# Patient Record
Sex: FEMALE | Race: BLACK OR AFRICAN AMERICAN | NOT HISPANIC OR LATINO | Employment: OTHER | URBAN - METROPOLITAN AREA
[De-identification: names, ages, dates, MRNs, and addresses within clinical notes are randomized per-mention and may not be internally consistent; named-entity substitution may affect disease eponyms.]

---

## 2022-01-28 ENCOUNTER — APPOINTMENT (OUTPATIENT)
Dept: RADIOLOGY | Facility: CLINIC | Age: 77
End: 2022-01-28
Payer: COMMERCIAL

## 2022-01-28 ENCOUNTER — HOSPITAL ENCOUNTER (OUTPATIENT)
Facility: HOSPITAL | Age: 77
Setting detail: OBSERVATION
Discharge: HOME | End: 2022-01-29
Attending: EMERGENCY MEDICINE | Admitting: SURGERY
Payer: COMMERCIAL

## 2022-01-28 ENCOUNTER — OFFICE VISIT (OUTPATIENT)
Dept: URGENT CARE | Facility: CLINIC | Age: 77
End: 2022-01-28
Payer: COMMERCIAL

## 2022-01-28 VITALS
HEIGHT: 66 IN | OXYGEN SATURATION: 100 % | HEART RATE: 87 BPM | BODY MASS INDEX: 26.68 KG/M2 | RESPIRATION RATE: 18 BRPM | WEIGHT: 166 LBS | TEMPERATURE: 98.6 F

## 2022-01-28 DIAGNOSIS — R07.81 RIB PAIN ON RIGHT SIDE: ICD-10-CM

## 2022-01-28 DIAGNOSIS — S22.41XA CLOSED FRACTURE OF MULTIPLE RIBS OF RIGHT SIDE, INITIAL ENCOUNTER: Primary | ICD-10-CM

## 2022-01-28 DIAGNOSIS — R53.1 WEAKNESS: ICD-10-CM

## 2022-01-28 PROBLEM — G89.11 ACUTE PAIN DUE TO TRAUMA: Status: ACTIVE | Noted: 2022-01-28

## 2022-01-28 PROBLEM — S22.49XA CLOSED FRACTURE OF MULTIPLE RIBS: Status: ACTIVE | Noted: 2022-01-28

## 2022-01-28 PROBLEM — D46.9 MYELODYSPLASTIC SYNDROME (HCC): Status: ACTIVE | Noted: 2022-01-28

## 2022-01-28 PROBLEM — I25.10 CAD (CORONARY ARTERY DISEASE): Chronic | Status: ACTIVE | Noted: 2022-01-28
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
## 2022-01-28 PROBLEM — Z86.69 HISTORY OF SEIZURE DISORDER: Chronic | Status: ACTIVE | Noted: 2022-01-28

## 2022-01-28 LAB
ALBUMIN SERPL BCP-MCNC: 3.2 G/DL (ref 3.5–5)
ALP SERPL-CCNC: 108 U/L (ref 46–116)
ALT SERPL W P-5'-P-CCNC: 23 U/L (ref 12–78)
ANION GAP SERPL CALCULATED.3IONS-SCNC: 11 MMOL/L (ref 4–13)
AST SERPL W P-5'-P-CCNC: 21 U/L (ref 5–45)
ATRIAL RATE: 82 BPM
BASOPHILS # BLD AUTO: 0.02 THOUSANDS/ΜL (ref 0–0.1)
BASOPHILS NFR BLD AUTO: 0 % (ref 0–1)
BILIRUB SERPL-MCNC: 0.18 MG/DL (ref 0.2–1)
BUN SERPL-MCNC: 20 MG/DL (ref 5–25)
CALCIUM ALBUM COR SERPL-MCNC: 8.9 MG/DL (ref 8.3–10.1)
CALCIUM SERPL-MCNC: 8.3 MG/DL (ref 8.3–10.1)
CARDIAC TROPONIN I PNL SERPL HS: 6 NG/L
CHLORIDE SERPL-SCNC: 107 MMOL/L (ref 100–108)
CK SERPL-CCNC: 106 U/L (ref 26–192)
CO2 SERPL-SCNC: 21 MMOL/L (ref 21–32)
CREAT SERPL-MCNC: 1.27 MG/DL (ref 0.6–1.3)
EOSINOPHIL # BLD AUTO: 0 THOUSAND/ΜL (ref 0–0.61)
EOSINOPHIL NFR BLD AUTO: 0 % (ref 0–6)
ERYTHROCYTE [DISTWIDTH] IN BLOOD BY AUTOMATED COUNT: 18.9 % (ref 11.6–15.1)
GFR SERPL CREATININE-BSD FRML MDRD: 41 ML/MIN/1.73SQ M
GLUCOSE SERPL-MCNC: 105 MG/DL (ref 65–140)
HCT VFR BLD AUTO: 30.6 % (ref 34.8–46.1)
HGB BLD-MCNC: 9 G/DL (ref 11.5–15.4)
IMM GRANULOCYTES # BLD AUTO: 0.01 THOUSAND/UL (ref 0–0.2)
IMM GRANULOCYTES NFR BLD AUTO: 0 % (ref 0–2)
LYMPHOCYTES # BLD AUTO: 1.33 THOUSANDS/ΜL (ref 0.6–4.47)
LYMPHOCYTES NFR BLD AUTO: 26 % (ref 14–44)
MCH RBC QN AUTO: 25.3 PG (ref 26.8–34.3)
MCHC RBC AUTO-ENTMCNC: 29.4 G/DL (ref 31.4–37.4)
MCV RBC AUTO: 86 FL (ref 82–98)
MONOCYTES # BLD AUTO: 0.43 THOUSAND/ΜL (ref 0.17–1.22)
MONOCYTES NFR BLD AUTO: 8 % (ref 4–12)
NEUTROPHILS # BLD AUTO: 3.34 THOUSANDS/ΜL (ref 1.85–7.62)
NEUTS SEG NFR BLD AUTO: 66 % (ref 43–75)
NRBC BLD AUTO-RTO: 0 /100 WBCS
P AXIS: 86 DEGREES
PLATELET # BLD AUTO: 311 THOUSANDS/UL (ref 149–390)
PMV BLD AUTO: 10.9 FL (ref 8.9–12.7)
POTASSIUM SERPL-SCNC: 4.7 MMOL/L (ref 3.5–5.3)
PR INTERVAL: 172 MS
PROT SERPL-MCNC: 7.3 G/DL (ref 6.4–8.2)
QRS AXIS: 83 DEGREES
QRSD INTERVAL: 114 MS
QT INTERVAL: 404 MS
QTC INTERVAL: 472 MS
RBC # BLD AUTO: 3.56 MILLION/UL (ref 3.81–5.12)
SODIUM SERPL-SCNC: 139 MMOL/L (ref 136–145)
T WAVE AXIS: 76 DEGREES
VENTRICULAR RATE: 82 BPM
WBC # BLD AUTO: 5.13 THOUSAND/UL (ref 4.31–10.16)

## 2022-01-28 PROCEDURE — 99219 PR INITIAL OBSERVATION CARE/DAY 50 MINUTES: CPT | Performed by: SURGERY

## 2022-01-28 PROCEDURE — 85025 COMPLETE CBC W/AUTO DIFF WBC: CPT

## 2022-01-28 PROCEDURE — 99285 EMERGENCY DEPT VISIT HI MDM: CPT

## 2022-01-28 PROCEDURE — 96374 THER/PROPH/DIAG INJ IV PUSH: CPT

## 2022-01-28 PROCEDURE — 71101 X-RAY EXAM UNILAT RIBS/CHEST: CPT

## 2022-01-28 PROCEDURE — 99285 EMERGENCY DEPT VISIT HI MDM: CPT | Performed by: EMERGENCY MEDICINE

## 2022-01-28 PROCEDURE — 93010 ELECTROCARDIOGRAM REPORT: CPT | Performed by: INTERNAL MEDICINE

## 2022-01-28 PROCEDURE — S9083 URGENT CARE CENTER GLOBAL: HCPCS | Performed by: PHYSICIAN ASSISTANT

## 2022-01-28 PROCEDURE — 80053 COMPREHEN METABOLIC PANEL: CPT

## 2022-01-28 PROCEDURE — 99213 OFFICE O/P EST LOW 20 MIN: CPT | Performed by: PHYSICIAN ASSISTANT

## 2022-01-28 PROCEDURE — 84484 ASSAY OF TROPONIN QUANT: CPT

## 2022-01-28 PROCEDURE — 36415 COLL VENOUS BLD VENIPUNCTURE: CPT

## 2022-01-28 PROCEDURE — 82550 ASSAY OF CK (CPK): CPT

## 2022-01-28 PROCEDURE — 93005 ELECTROCARDIOGRAM TRACING: CPT

## 2022-01-28 RX ORDER — ASPIRIN 81 MG/1
81 TABLET, CHEWABLE ORAL DAILY
Status: DISCONTINUED | OUTPATIENT
Start: 2022-01-29 | End: 2022-01-29 | Stop reason: HOSPADM

## 2022-01-28 RX ORDER — ONDANSETRON 2 MG/ML
4 INJECTION INTRAMUSCULAR; INTRAVENOUS EVERY 6 HOURS PRN
Status: DISCONTINUED | OUTPATIENT
Start: 2022-01-28 | End: 2022-01-28

## 2022-01-28 RX ORDER — ACETAMINOPHEN 325 MG/1
975 TABLET ORAL EVERY 8 HOURS SCHEDULED
Status: DISCONTINUED | OUTPATIENT
Start: 2022-01-28 | End: 2022-01-29 | Stop reason: HOSPADM

## 2022-01-28 RX ORDER — ONDANSETRON 2 MG/ML
4 INJECTION INTRAMUSCULAR; INTRAVENOUS EVERY 4 HOURS PRN
Status: DISCONTINUED | OUTPATIENT
Start: 2022-01-28 | End: 2022-01-29 | Stop reason: HOSPADM

## 2022-01-28 RX ORDER — PHENOBARBITAL 32.4 MG/1
32.4 TABLET ORAL 2 TIMES DAILY
Status: DISCONTINUED | OUTPATIENT
Start: 2022-01-28 | End: 2022-01-29 | Stop reason: HOSPADM

## 2022-01-28 RX ORDER — POLYETHYLENE GLYCOL 3350 17 G/17G
17 POWDER, FOR SOLUTION ORAL DAILY PRN
Status: DISCONTINUED | OUTPATIENT
Start: 2022-01-28 | End: 2022-01-29 | Stop reason: HOSPADM

## 2022-01-28 RX ORDER — FENTANYL CITRATE 50 UG/ML
25 INJECTION, SOLUTION INTRAMUSCULAR; INTRAVENOUS ONCE
Status: COMPLETED | OUTPATIENT
Start: 2022-01-28 | End: 2022-01-28

## 2022-01-28 RX ORDER — HEPARIN SODIUM 5000 [USP'U]/ML
5000 INJECTION, SOLUTION INTRAVENOUS; SUBCUTANEOUS EVERY 8 HOURS SCHEDULED
Status: DISCONTINUED | OUTPATIENT
Start: 2022-01-28 | End: 2022-01-29 | Stop reason: HOSPADM

## 2022-01-28 RX ORDER — OXYCODONE HYDROCHLORIDE 5 MG/1
2.5 TABLET ORAL EVERY 4 HOURS PRN
Status: DISCONTINUED | OUTPATIENT
Start: 2022-01-28 | End: 2022-01-29 | Stop reason: HOSPADM

## 2022-01-28 RX ORDER — PHENYTOIN SODIUM 100 MG/1
100 CAPSULE, EXTENDED RELEASE ORAL 3 TIMES DAILY
COMMUNITY
Start: 2022-01-25

## 2022-01-28 RX ORDER — LIDOCAINE 50 MG/G
1 PATCH TOPICAL ONCE
Status: COMPLETED | OUTPATIENT
Start: 2022-01-28 | End: 2022-01-29

## 2022-01-28 RX ORDER — LIDOCAINE 50 MG/G
1 PATCH TOPICAL DAILY
Status: ACTIVE | OUTPATIENT
Start: 2022-01-28 | End: 2022-01-29

## 2022-01-28 RX ORDER — PHENOBARBITAL 30 MG/1
30 TABLET ORAL 2 TIMES DAILY
COMMUNITY
Start: 2022-01-27

## 2022-01-28 RX ORDER — METHOCARBAMOL 500 MG/1
250 TABLET, FILM COATED ORAL EVERY 6 HOURS SCHEDULED
Status: DISCONTINUED | OUTPATIENT
Start: 2022-01-28 | End: 2022-01-29

## 2022-01-28 RX ORDER — ATORVASTATIN CALCIUM 20 MG/1
20 TABLET, FILM COATED ORAL
COMMUNITY
Start: 2021-12-09

## 2022-01-28 RX ORDER — PHENYTOIN SODIUM 100 MG/1
100 CAPSULE, EXTENDED RELEASE ORAL EVERY 8 HOURS SCHEDULED
Status: DISCONTINUED | OUTPATIENT
Start: 2022-01-28 | End: 2022-01-29 | Stop reason: HOSPADM

## 2022-01-28 RX ORDER — POLYETHYLENE GLYCOL 3350 17 G/17G
1 POWDER, FOR SOLUTION ORAL DAILY PRN
COMMUNITY

## 2022-01-28 RX ORDER — FUROSEMIDE 20 MG/1
20 TABLET ORAL AS NEEDED
COMMUNITY

## 2022-01-28 RX ORDER — ALBUTEROL SULFATE 90 UG/1
2 AEROSOL, METERED RESPIRATORY (INHALATION) EVERY 6 HOURS PRN
COMMUNITY
Start: 2021-12-22 | End: 2022-12-22

## 2022-01-28 RX ORDER — ATORVASTATIN CALCIUM 40 MG/1
20 TABLET, FILM COATED ORAL
Status: DISCONTINUED | OUTPATIENT
Start: 2022-01-28 | End: 2022-01-29 | Stop reason: HOSPADM

## 2022-01-28 RX ORDER — HYDROMORPHONE HCL/PF 1 MG/ML
0.2 SYRINGE (ML) INJECTION EVERY 4 HOURS PRN
Status: DISCONTINUED | OUTPATIENT
Start: 2022-01-28 | End: 2022-01-29 | Stop reason: HOSPADM

## 2022-01-28 RX ORDER — FERROUS SULFATE 325(65) MG
325 TABLET ORAL 2 TIMES DAILY WITH MEALS
Status: DISCONTINUED | OUTPATIENT
Start: 2022-01-28 | End: 2022-01-29 | Stop reason: HOSPADM

## 2022-01-28 RX ORDER — ALBUTEROL SULFATE 90 UG/1
2 AEROSOL, METERED RESPIRATORY (INHALATION) EVERY 6 HOURS PRN
Status: DISCONTINUED | OUTPATIENT
Start: 2022-01-28 | End: 2022-01-29 | Stop reason: HOSPADM

## 2022-01-28 RX ORDER — GLUC/MSM/COLGN2/HYAL/ANTIARTH3 375-375-20
1 TABLET ORAL 2 TIMES DAILY
COMMUNITY

## 2022-01-28 RX ORDER — FERROUS SULFATE 325(65) MG
325 TABLET ORAL 2 TIMES DAILY WITH MEALS
COMMUNITY

## 2022-01-28 RX ORDER — ACETAMINOPHEN 325 MG/1
975 TABLET ORAL ONCE
Status: COMPLETED | OUTPATIENT
Start: 2022-01-28 | End: 2022-01-28

## 2022-01-28 RX ORDER — DOCUSATE SODIUM 100 MG/1
100 CAPSULE, LIQUID FILLED ORAL 2 TIMES DAILY
Status: DISCONTINUED | OUTPATIENT
Start: 2022-01-28 | End: 2022-01-28

## 2022-01-28 RX ORDER — SENNOSIDES 8.6 MG
2 TABLET ORAL DAILY
Status: DISCONTINUED | OUTPATIENT
Start: 2022-01-28 | End: 2022-01-28

## 2022-01-28 RX ORDER — OXYCODONE HYDROCHLORIDE 5 MG/1
5 TABLET ORAL EVERY 4 HOURS PRN
Status: DISCONTINUED | OUTPATIENT
Start: 2022-01-28 | End: 2022-01-29 | Stop reason: HOSPADM

## 2022-01-28 RX ORDER — AMOXICILLIN 250 MG
1 CAPSULE ORAL 2 TIMES DAILY
Status: DISCONTINUED | OUTPATIENT
Start: 2022-01-28 | End: 2022-01-29 | Stop reason: HOSPADM

## 2022-01-28 RX ORDER — B-COMPLEX WITH VITAMIN C
1 TABLET ORAL
Status: DISCONTINUED | OUTPATIENT
Start: 2022-01-29 | End: 2022-01-29 | Stop reason: HOSPADM

## 2022-01-28 RX ADMIN — OXYCODONE HYDROCHLORIDE 5 MG: 5 TABLET ORAL at 18:33

## 2022-01-28 RX ADMIN — PHENOBARBITAL 32.4 MG: 32.4 TABLET ORAL at 17:02

## 2022-01-28 RX ADMIN — ACETAMINOPHEN 975 MG: 325 TABLET, FILM COATED ORAL at 12:35

## 2022-01-28 RX ADMIN — LIDOCAINE 5% 1 PATCH: 700 PATCH TOPICAL at 12:35

## 2022-01-28 RX ADMIN — PHENYTOIN SODIUM 100 MG: 100 CAPSULE ORAL at 17:02

## 2022-01-28 RX ADMIN — FENTANYL CITRATE 25 MCG: 50 INJECTION INTRAMUSCULAR; INTRAVENOUS at 14:33

## 2022-01-28 RX ADMIN — HYDROMORPHONE HYDROCHLORIDE 0.2 MG: 1 INJECTION, SOLUTION INTRAMUSCULAR; INTRAVENOUS; SUBCUTANEOUS at 20:09

## 2022-01-28 RX ADMIN — FERROUS SULFATE TAB 325 MG (65 MG ELEMENTAL FE) 325 MG: 325 (65 FE) TAB at 17:04

## 2022-01-28 RX ADMIN — ATORVASTATIN CALCIUM 20 MG: 40 TABLET, FILM COATED ORAL at 17:05

## 2022-01-28 RX ADMIN — HEPARIN SODIUM 5000 UNITS: 5000 INJECTION INTRAVENOUS; SUBCUTANEOUS at 21:14

## 2022-01-28 RX ADMIN — PHENYTOIN SODIUM 100 MG: 100 CAPSULE ORAL at 21:14

## 2022-01-28 RX ADMIN — METHOCARBAMOL 250 MG: 500 TABLET ORAL at 17:02

## 2022-01-28 RX ADMIN — POLYETHYLENE GLYCOL 3350 17 G: 17 POWDER, FOR SOLUTION ORAL at 18:25

## 2022-01-28 RX ADMIN — DOCUSATE SODIUM AND SENNOSIDES 1 TABLET: 8.6; 5 TABLET, FILM COATED ORAL at 17:04

## 2022-01-28 RX ADMIN — ACETAMINOPHEN 975 MG: 325 TABLET, FILM COATED ORAL at 21:14

## 2022-01-28 NOTE — ASSESSMENT & PLAN NOTE
- Chronic history of mild dysplastic syndrome with no acute issues  - Continue home medication regimen as indicated    - Outpatient follow-up with PCP and Hematologist

## 2022-01-28 NOTE — ASSESSMENT & PLAN NOTE
- Patient with history of CAD and prior CABG with no evidence of acute coronary syndrome at this time  - Continue home medication regimen   - Outpatient follow-up with PCP

## 2022-01-28 NOTE — ASSESSMENT & PLAN NOTE
- History of seizure disorder with no recent seizures  - Continue home medication regimen   - Outpatient follow-up PCP

## 2022-01-28 NOTE — ASSESSMENT & PLAN NOTE
- Multiple right-sided rib fractures (7-8), present on admission   - Continue rib fracture protocol   - Continue to encourage incentive spirometer use and adequate pulmonary hygiene  Currently pulling >2,000 mL on I S   - Continue multimodal analgesic regimen  - May provide supplemental oxygen via nasal cannula as needed to maintain saturations greater than or equal to 94% if necessary  Patient currently on room air and maintaining oxygen saturations appropriately  - Repeat chest x-ray on 01/29/2022    - PT and OT evaluation and treatment as indicated

## 2022-01-28 NOTE — ED PROVIDER NOTES
History  Chief Complaint   Patient presents with   Romel Dietz Fall     PT presents after being seen at urgent care for a fall 4 days ago  PT fell on right side fracturing a few ribs  PT c/o rib pain and SOB  PT denies headstrike or LOC but does take ASA daily     Patient is a 72-year-old female presenting with chief complaint of right-sided chest wall pain  Patient fell at home on 01/24 and injured right-side of chest wall when she landed against the armrest of her couch  She attributes her fall to residual generalized weakness since her COVID infection weeks prior  Patient denies head strike with no LOC  Patient notes she has been falling more often since her COVID infection  Currently, patient rates right chest wall pain at 4/10 at rest   Activity increases intensity of pain to 8/10  Patient denies shortness of breath at rest   Patient admits shortness of breath on exertion  Patient denies substernal chest pressure, numbness/tingling, localized weakness, confusion, lightheadedness  She has taken ibuprofen 200 mg at home with no relief to right chest wall pain  Fall  Associated symptoms: no abdominal pain, no chest pain, no headaches, no nausea and no vomiting        Prior to Admission Medications   Prescriptions Last Dose Informant Patient Reported? Taking?    Aspirin 81 MG CAPS   Yes No   Sig: Take 81 mg by mouth daily     Calcium Carbonate-Vitamin D 600-200 MG-UNIT CAPS   Yes No   Sig: Take 1 tablet by mouth 2 (two) times a day   PHENobarbital 30 mg tablet   Yes No   Sig: Take 30 mg by mouth 2 (two) times a day   albuterol (PROVENTIL HFA,VENTOLIN HFA) 90 mcg/act inhaler   Yes No   Sig: Inhale 2 puffs every 6 (six) hours as needed   atorvastatin (LIPITOR) 20 mg tablet   Yes No   Sig: Take 20 mg by mouth   darbepoetin rhonda (ARANESP) 100 mcg/mL   Yes No   Sig: Inject 75 mcg under the skin   ferrous sulfate 325 (65 Fe) mg tablet   Yes No   Sig: Take 325 mg by mouth 2 (two) times a day with meals     furosemide (LASIX) 20 mg tablet   Yes No   Sig: Take 20 mg by mouth as needed   phenytoin (DILANTIN) 100 mg ER capsule   Yes No   Sig: Take 100 mg by mouth Three times a day   polyethylene glycol (MiraLax) 17 g packet   Yes No   Sig: Take 1 application by mouth daily as needed        Facility-Administered Medications: None       Past Medical History:   Diagnosis Date    Anemia     Carotid stenosis     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)     Coronary artery disease     Essential hypertension     Ischemic cardiomyopathy     Mixed hyperlipidemia     Myelodysplastic syndrome (HCC)     Seizures (HCC)     Venous insufficiency        Past Surgical History:   Procedure Laterality Date    CORONARY ARTERY BYPASS GRAFT      x5       History reviewed  No pertinent family history  I have reviewed and agree with the history as documented  E-Cigarette/Vaping    E-Cigarette Use Never User      E-Cigarette/Vaping Substances    Nicotine No     THC No     CBD No     Flavoring No     Other No     Unknown No      Social History     Tobacco Use    Smoking status: Former Smoker     Types: Cigarettes    Smokeless tobacco: Never Used   Vaping Use    Vaping Use: Never used   Substance Use Topics    Alcohol use: Not Currently    Drug use: Never        Review of Systems   Constitutional: Negative for chills and fever  HENT: Negative for rhinorrhea and sore throat  Eyes: Negative for photophobia and visual disturbance  Respiratory: Negative for shortness of breath and wheezing  Cardiovascular: Positive for leg swelling (Baseline)  Negative for chest pain and palpitations  Gastrointestinal: Negative for abdominal pain, diarrhea, nausea and vomiting  Genitourinary: Negative for dysuria and hematuria  Musculoskeletal: Positive for gait problem  Right-sided chest wall pain   Skin: Negative for color change and wound     Neurological: Negative for dizziness, weakness, light-headedness, numbness and headaches  Psychiatric/Behavioral: Negative for confusion and sleep disturbance  Physical Exam  ED Triage Vitals [01/28/22 1111]   Temperature Pulse Respirations Blood Pressure SpO2   98 1 °F (36 7 °C) 91 22 (!) 209/88 100 %      Temp Source Heart Rate Source Patient Position - Orthostatic VS BP Location FiO2 (%)   Oral Monitor Lying Right arm --      Pain Score       8             Orthostatic Vital Signs  Vitals:    01/28/22 1230 01/28/22 1315 01/28/22 1445 01/28/22 1500   BP: (!) 197/87 (!) 181/85 (!) 173/80 160/94   Pulse: 82      Patient Position - Orthostatic VS:           Physical Exam  Vitals reviewed  Constitutional:       General: She is not in acute distress  Appearance: She is not toxic-appearing  HENT:      Head: Normocephalic and atraumatic  Eyes:      Extraocular Movements: Extraocular movements intact  Conjunctiva/sclera: Conjunctivae normal    Cardiovascular:      Rate and Rhythm: Normal rate and regular rhythm  Heart sounds: Normal heart sounds  No murmur heard  Comments: Sitting /94mmHg  Standing /77mmHg  Pulmonary:      Effort: Pulmonary effort is normal  No respiratory distress  Breath sounds: Normal breath sounds  No wheezing, rhonchi or rales  Chest:      Chest wall: Tenderness: Chest wall tenderness on right side  Abdominal:      General: There is no distension  Tenderness: There is no abdominal tenderness  Musculoskeletal:         General: Tenderness (Chest wall tenderness on right side) present  Cervical back: Normal range of motion  No rigidity  Right lower leg: Edema present  Left lower leg: Edema present  Skin:     General: Skin is warm and dry  Neurological:      Mental Status: She is alert and oriented to person, place, and time  Mental status is at baseline     Psychiatric:         Mood and Affect: Mood normal          Behavior: Behavior normal          ED Medications  Medications   lidocaine (LIDODERM) 5 % patch 1 patch (1 patch Topical Medication Applied 1/28/22 1235)   heparin (porcine) subcutaneous injection 5,000 Units (has no administration in time range)   acetaminophen (TYLENOL) tablet 975 mg (has no administration in time range)   lidocaine (LIDODERM) 5 % patch 1 patch (has no administration in time range)   oxyCODONE (ROXICODONE) IR tablet 2 5 mg (has no administration in time range)   HYDROmorphone (DILAUDID) injection 0 2 mg (has no administration in time range)   oxyCODONE (ROXICODONE) IR tablet 5 mg (has no administration in time range)   senna-docusate sodium (SENOKOT S) 8 6-50 mg per tablet 1 tablet (has no administration in time range)   polyethylene glycol (MIRALAX) packet 17 g (has no administration in time range)   ondansetron (ZOFRAN) injection 4 mg (has no administration in time range)   naloxone (NARCAN) 0 04 mg/mL syringe 0 04 mg (has no administration in time range)   methocarbamol (ROBAXIN) tablet 250 mg (has no administration in time range)   acetaminophen (TYLENOL) tablet 975 mg (975 mg Oral Given 1/28/22 1235)   fentanyl citrate (PF) 100 MCG/2ML 25 mcg (25 mcg Intravenous Given 1/28/22 1433)       Diagnostic Studies  Results Reviewed     Procedure Component Value Units Date/Time    Platelet count [[id number]]     Lab Status: No result Specimen: Blood     Comprehensive metabolic panel [[id number]]  (Abnormal) Collected: 01/28/22 1221    Lab Status: Final result Specimen: Blood from Arm, Left Updated: 01/28/22 1406     Sodium 139 mmol/L      Potassium 4 7 mmol/L      Chloride 107 mmol/L      CO2 21 mmol/L      ANION GAP 11 mmol/L      BUN 20 mg/dL      Creatinine 1 27 mg/dL      Glucose 105 mg/dL      Calcium 8 3 mg/dL      Corrected Calcium 8 9 mg/dL      AST 21 U/L      ALT 23 U/L      Alkaline Phosphatase 108 U/L      Total Protein 7 3 g/dL      Albumin 3 2 g/dL      Total Bilirubin 0 18 mg/dL      eGFR 41 ml/min/1 73sq m     Narrative:      [place] guidelines for Chronic Kidney Disease (CKD):     Stage 1 with normal or high GFR (GFR > 90 mL/min/1 73 square meters)    Stage 2 Mild CKD (GFR = 60-89 mL/min/1 73 square meters)    Stage 3A Moderate CKD (GFR = 45-59 mL/min/1 73 square meters)    Stage 3B Moderate CKD (GFR = 30-44 mL/min/1 73 square meters)    Stage 4 Severe CKD (GFR = 15-29 mL/min/1 73 square meters)    Stage 5 End Stage CKD (GFR <15 mL/min/1 73 square meters)  Note: GFR calculation is accurate only with a steady state creatinine    CK Total with Reflex CKMB [[id number]]  (Normal) Collected: 01/28/22 1221    Lab Status: Final result Specimen: Blood from Arm, Left Updated: 01/28/22 1347     Total  U/L     HS Troponin 0hr (reflex protocol) [[id number]]  (Normal) Collected: 01/28/22 1221    Lab Status: Final result Specimen: Blood from Arm, Left Updated: 01/28/22 1316     hs TnI 0hr 6 ng/L     CBC and differential [[id number]]  (Abnormal) Collected: 01/28/22 1221    Lab Status: Final result Specimen: Blood from Arm, Left Updated: 01/28/22 1231     WBC 5 13 Thousand/uL      RBC 3 56 Million/uL      Hemoglobin 9 0 g/dL      Hematocrit 30 6 %      MCV 86 fL      MCH 25 3 pg      MCHC 29 4 g/dL      RDW 18 9 %      MPV 10 9 fL      Platelets 731 Thousands/uL      nRBC 0 /100 WBCs      Neutrophils Relative 66 %      Immat GRANS % 0 %      Lymphocytes Relative 26 %      Monocytes Relative 8 %      Eosinophils Relative 0 %      Basophils Relative 0 %      Neutrophils Absolute 3 34 Thousands/µL      Immature Grans Absolute 0 01 Thousand/uL      Lymphocytes Absolute 1 33 Thousands/µL      Monocytes Absolute 0 43 Thousand/µL      Eosinophils Absolute 0 00 Thousand/µL      Basophils Absolute 0 02 Thousands/µL                  XR chest pa & lateral (24 hours after admission)    (Results Pending)         Procedures  ECG 12 Lead Documentation Only    Date/Time: 1/28/2022 12:05 PM  Performed by: Rosemary Queen DO  Authorized by: Rosemary Queen DO ECG reviewed by me, the ED Provider: yes    Patient location:  ED  Interpretation:     Interpretation: normal    Rate:     ECG rate:  82    ECG rate assessment: normal    Rhythm:     Rhythm: sinus rhythm    QRS:     QRS axis:  Normal    QRS intervals: mildly increased   Conduction:     Conduction: normal    ST segments:     ST segments:  Normal  T waves:     T waves: normal            ED Course                             SBIRT 22yo+      Most Recent Value   SBIRT (22 yo +)    In order to provide better care to our patients, we are screening all of our patients for alcohol and drug use  Would it be okay to ask you these screening questions? Unable to answer at this time Filed at: 01/28/2022 1131                MDM  Number of Diagnoses or Management Options  Closed fracture of multiple ribs of right side, initial encounter  Diagnosis management comments: Right chest wall pain  Chest x-ray reveals acute mildly displaced rib fractures of ribs 6 and 7 on lateral aspect of right side  Patient hypertensive, likely due to pain  Technically, patient meets criteria for orthostatic hypotension without syncopal-like symptoms  Pain management provided  Discussion with trauma physician Dr Mervat Reeves about continued right chest wall pain even after administering pain medications and continued SOB on exertion  Trauma team evaluated patient and decided to admit patient under observation          Amount and/or Complexity of Data Reviewed  Clinical lab tests: ordered and reviewed  Tests in the radiology section of CPT®: ordered and reviewed  Tests in the medicine section of CPT®: ordered and reviewed        Disposition  Final diagnoses:   Closed fracture of multiple ribs of right side, initial encounter     Time reflects when diagnosis was documented in both MDM as applicable and the Disposition within this note     Time User Action Codes Description Comment    1/28/2022  2:39 PM Cody Inman Add [[id number]] Closed fracture of multiple ribs of right side, initial encounter       ED Disposition     ED Disposition Condition Date/Time Comment    Admit Stable Fri Jan 28, 2022  3:30 PM Case was discussed with Dr Bill Bradford (trauma) and the patient's admission status was agreed to be Admission Status: observation status to the service of Dr Bill Bradford  Follow-up Information    None         Patient's Medications   Discharge Prescriptions    No medications on file     No discharge procedures on file  PDMP Review     None           ED Provider  Attending physically available and evaluated [facility]  I managed the patient along with the ED Attending      Electronically Signed by         Ezio Porter DO  01/28/22 1538

## 2022-01-28 NOTE — ED ATTENDING ATTESTATION
1/28/2022  ILucila MD, saw and evaluated the patient  I have discussed the patient with the resident/non-physician practitioner and agree with the resident's/non-physician practitioner's findings, Plan of Care, and MDM as documented in the resident's/non-physician practitioner's note, except where noted  All available labs and Radiology studies were reviewed  I was present for key portions of any procedure(s) performed by the resident/non-physician practitioner and I was immediately available to provide assistance  At this point I agree with the current assessment done in the Emergency Department  I have conducted an independent evaluation of this patient a history and physical is as follows:    77-year-old female presents to the emergency department for evaluation right-sided chest pain  This has been ongoing for 4 days since having been weak and having fallen into the wooden arm of furniture  She denies having had any loss of consciousness or head impact at that time  She has had constant pain in the right side of the chest   This eases some at rest and intensifies with movement  She notes that she does have anemia at baseline with hemoglobins in the mid 8 range  As a result of this as well as having had COVID infection at the end of November she reports some ongoing weakness and shortness of breath beyond baseline  Since having fallen and impacting the chest she has noticed worsening of this with any mild activity  Her daughter notes that her breathing seems fine when she is seated but if she goes to change position even just to slightly more upright to eat a few bites of food or to get dressed or bathed breathing is much worse  Patient relates that this is a good portion of why she came in today  She denies having had lightheadedness, nausea, vomiting or change in appetite/oral intake  Blood pressure is elevated here today    Patient and daughter do believe that this is from discomfort  Daughter expresses hesitancy in blood pressure medications being used as she notes that for the past few years upon standing her blood pressure dips significantly  Thus far patient has tried occasional doses of ibuprofen 200 mg without relief in pain  She has not tried any other medications or modalities  Past medical history significant for seizure disorder, myelodysplasia with anemia, CAD, ischemic cardiomyopathy and CHF  A review blood pressures from routine office visits include December 22nd-159/74, November 5th 159/79 and October 12th 124/74  Patient did go to an urgent care center prior to this ED visit and was referred in  An x-ray was performed which revealed the presence of 2 minimally displaced rib fractures  On exam patient appears mildly uncomfortable at rest   Her mucous membranes are moist in her speech is clear  Heart sounds regular  Lungs clear to auscultation bilaterally  There is no tenderness of the posterior cervical, thoracic or lumbar region  No posterior right thoracic tenderness  Anterior right lower chest wall tenderness present without discoloration or appreciated crepitus  Abdomen is soft and nontender  Pupils briskly reactive to light bilaterally  EOMI  No facial motor or sensory abnormality appreciated  Good strength with upper and lower extremity movements  Upper and lower extremities nontender  +2 to +3 lower extremity edema with compression socks in place  Plus two bilateral PT pulses  Given increased weakness and baseline history of anemia will check labs including CBC  With blood pressure significantly elevated checking chemistry, EKG and troponin as well  Will administer acetaminophen high-dose along with lidocaine patch for pain relief  Explained to patient and daughter hesitancy in prescribing other stronger medications at this time as these can affect sensorium and reaction time    Should study results be unremarkable, blood pressure improved and pain adequately controlled she might be a candidate for discharge home  If concerning findings are found on studies and or pain/blood pressure are not adequately improved anticipate use of other analgesics and consult to Trauma Service with potential admission  ED Course  ED Course as of 01/28/22 1540   Fri Jan 28, 2022   1405 Hgb improved from mid - 8 range  Blood pressure has trended down to systolics in the 929N over 80s  Chemistry remains pending  CK and troponin are within normal limits  56 Chemistry has returned at baseline  GFR is 41  Systemic NSAID use contraindicated  18 Spoke with patient  She does not report much improvement discomfort  Blood pressure has lowered to 180s over 80s-acceptable for now  With concern for potential side effects other analgesics used will reach out to trauma service for possible admission  Fentanyl ordered at this time           Critical Care Time  Procedures

## 2022-01-28 NOTE — H&P
[facility]  H&P- Fidelina Davison 1945, 78 y o  female MRN: [id number]  Unit/Bed#: ED 15 Encounter: [id number]  Primary Care Provider: Lorelei Griffiths PA-C   Date and time admitted to hospital: 1/28/2022 11:03 AM    Fall  Assessment & Plan  - Status post fall with the below noted injuries  - Fall precautions   - PT and OT evaluation and treatment as indicated  - Case Management consultation for disposition planning  * Closed fracture of multiple ribs  Assessment & Plan  - Multiple right-sided rib fractures (7-8), present on admission   - Continue rib fracture protocol   - Continue to encourage incentive spirometer use and adequate pulmonary hygiene  Currently pulling >2,000 mL on I S   - Continue multimodal analgesic regimen  - May provide supplemental oxygen via nasal cannula as needed to maintain saturations greater than or equal to 94% if necessary  Patient currently on room air and maintaining oxygen saturations appropriately  - Repeat chest x-ray on 01/29/2022    - PT and OT evaluation and treatment as indicated  Acute pain due to trauma  Assessment & Plan  - Acute pain due to traumatic injuries  - Initiate multimodal analgesic regimen  - Initiate bowel regimen  - Monitor for adequate pain control and adjust regimen as indicated  Consider acute pain service consultation if pain and adequately controlled  CAD (coronary artery disease)  Assessment & Plan  - Patient with history of CAD and prior CABG with no evidence of acute coronary syndrome at this time  - Continue home medication regimen   - Outpatient follow-up with PCP  History of seizure disorder  Assessment & Plan  - History of seizure disorder with no recent seizures  - Continue home medication regimen   - Outpatient follow-up PCP  Myelodysplastic syndrome ([facility] [address] )  Assessment & Plan  - Chronic history of mild dysplastic syndrome with no acute issues    - Continue home medication regimen as indicated  - Outpatient follow-up with PCP and Hematologist       Disposition:  Admit to the trauma service for management of rib fractures and acute pain  Await therapy evaluation recommendations  H&P Exam - Trauma   Carlo Landis 78 y o  female MRN: [id number]  Unit/Bed#: ED 15 Encounter: [id number]    Assessment/Plan   Trauma Alert: Evaluation  Model of Arrival: [facility]  Trauma Team: Attending Dr Ethel Hi and EMILY Haile PA-C  Consultants: None    Trauma Active Problems and Trauma Plan: See above  Chief Complaint:  My chest hurts      History of Present Illness   HPI:  Candelario Sacks is a 78 y o  female who presents with right-sided chest wall pain following 2 recent falls  She initially fell 2 weeks ago and had another fall this past Sunday striking her right side on a chair  She denied hitting her head or losing consciousness during either fall  She only complains of right-sided chest wall pain and had some difficulty breathing  She notes some chronic issues with breathing and dyspnea on exertion since infection with COVID last fall  Mechanism:Fall    Review of Systems   Constitutional: Negative  Negative for activity change, appetite change, chills, diaphoresis, fatigue and fever  HENT: Negative  Negative for congestion, facial swelling and sore throat  Eyes: Negative  Respiratory: Negative  Negative for cough, chest tightness, shortness of breath and wheezing  Cardiovascular: Positive for chest pain (Right anterior and lateral chest wall pain)  Negative for leg swelling  Gastrointestinal: Negative  Negative for abdominal distention, abdominal pain, constipation, diarrhea, nausea and vomiting  Endocrine: Negative  Genitourinary: Negative  Negative for difficulty urinating, dysuria, flank pain, frequency and hematuria  Musculoskeletal: Negative  Negative for arthralgias, back pain, myalgias and neck pain  Skin: Negative    Negative for color change, pallor, rash and wound  Allergic/Immunologic: Negative  Neurological: Negative  Negative for dizziness, syncope, weakness, light-headedness and headaches  Hematological: Negative  Psychiatric/Behavioral: Negative  Negative for agitation, confusion, self-injury and sleep disturbance  The patient is not nervous/anxious  12-point, complete review of systems was reviewed and negative except as stated above         Historical Information   Efforts to obtain history included the following sources: other medical personnel, obtained from other records    Past Medical History:   Diagnosis Date    Anemia     Carotid stenosis     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 ([facility] )     Coronary artery disease     Essential hypertension     Ischemic cardiomyopathy     Mixed hyperlipidemia     Myelodysplastic syndrome ([facility] )     Seizures ([facility] )     Venous insufficiency      Past Surgical History:   Procedure Laterality Date    CORONARY ARTERY BYPASS GRAFT      x5     Social History   Social History     Substance and Sexual Activity   Alcohol Use Not Currently     Social History     Substance and Sexual Activity   Drug Use Never     Social History     Tobacco Use   Smoking Status Former Smoker    Types: Cigarettes   Smokeless Tobacco Never Used     E-Cigarette/Vaping    E-Cigarette Use Never User      E-Cigarette/Vaping Substances    Nicotine No     THC No     CBD No     Flavoring No     Other No     Unknown No      Immunization History   Administered Date(s) Administered    COVID-19 PFIZER VACCINE 0 3 ML IM 03/23/2021, 04/13/2021    INFLUENZA 09/18/2020, 10/12/2021    Influenza, Seasonal Vaccine, Quadrivalent, Adjuvanted,  5e 09/18/2020, 10/12/2021    Tdap 06/06/2019     Last Tetanus: Unknown  Family History: Non-contributory  Unable to obtain/limited by N/A      Meds/Allergies   all current active meds have been reviewed, current meds:   Current Facility-Administered Medications   Medication Dose Route Frequency    acetaminophen (TYLENOL) tablet 975 mg  975 mg Oral Q8H [address]    heparin (porcine) subcutaneous injection 5,000 Units  5,000 Units Subcutaneous Q8H [address]    HYDROmorphone (DILAUDID) injection 0 2 mg  0 2 mg Intravenous Q4H PRN    lidocaine (LIDODERM) 5 % patch 1 patch  1 patch Topical Once    lidocaine (LIDODERM) 5 % patch 1 patch  1 patch Topical Daily    methocarbamol (ROBAXIN) tablet 250 mg  250 mg Oral Q6H [address]    naloxone (NARCAN) 0 04 mg/mL syringe 0 04 mg  0 04 mg Intravenous Q1MIN PRN    ondansetron (ZOFRAN) injection 4 mg  4 mg Intravenous Q4H PRN    oxyCODONE (ROXICODONE) IR tablet 2 5 mg  2 5 mg Oral Q4H PRN    oxyCODONE (ROXICODONE) IR tablet 5 mg  5 mg Oral Q4H PRN    polyethylene glycol (MIRALAX) packet 17 g  17 g Oral Daily PRN    senna-docusate sodium (SENOKOT S) 8 6-50 mg per tablet 1 tablet  1 tablet Oral BID    and PTA meds:   Prior to Admission Medications   Prescriptions Last Dose Informant Patient Reported? Taking?    Aspirin 81 MG CAPS   Yes No   Sig: Take 81 mg by mouth daily     Calcium Carbonate-Vitamin D 600-200 MG-UNIT CAPS   Yes No   Sig: Take 1 tablet by mouth 2 (two) times a day   PHENobarbital 30 mg tablet   Yes No   Sig: Take 30 mg by mouth 2 (two) times a day   albuterol (PROVENTIL HFA,VENTOLIN HFA) 90 mcg/act inhaler   Yes No   Sig: Inhale 2 puffs every 6 (six) hours as needed   atorvastatin (LIPITOR) 20 mg tablet   Yes No   Sig: Take 20 mg by mouth   darbepoetin rhonda (ARANESP) 100 mcg/mL   Yes No   Sig: Inject 75 mcg under the skin   ferrous sulfate 325 (65 Fe) mg tablet   Yes No   Sig: Take 325 mg by mouth 2 (two) times a day with meals     furosemide (LASIX) 20 mg tablet   Yes No   Sig: Take 20 mg by mouth as needed   phenytoin (DILANTIN) 100 mg ER capsule   Yes No   Sig: Take 100 mg by mouth Three times a day   polyethylene glycol (MiraLax) 17 g packet   Yes No   Sig: Take 1 application by mouth daily as needed        Facility-Administered Medications: None       Allergies   Allergen Reactions    Carvedilol Other (See Comments)     Orthostatic Hypotension    Iodinated Diagnostic Agents Angioedema    Shellfish Allergy - Food Allergy Angioedema    Lisinopril Cough         PHYSICAL EXAM    PE limited by: N/A    Objective   Vitals:   First set: Temperature: 98 1 °F (36 7 °C) (01/28/22 1111)  Pulse: 91 (01/28/22 1111)  Respirations: 22 (01/28/22 1111)  Blood Pressure: (!) 209/88 (01/28/22 1111)    Primary Survey:   (A) Airway:  Patent and intact  (B) Breathing:  Clear and equal bilaterally  (C) Circulation: Pulses:   normal, carotid  2/4, pedal  2/4, radial  2/4 and femoral  2/4  (D) Disablity:  GCS Total:  15, Eye Opening:   Spontaneous = 4, Motor Response: Obeys commands = 6 and Verbal Response:  Oriented = 5  (E) Expose:  Completed    Secondary Survey: (Click on Physical Exam tab above)  Physical Exam  Vitals and nursing note reviewed  Exam conducted with a chaperone present  Constitutional:       General: She is awake  She is not in acute distress  Appearance: Normal appearance  She is not ill-appearing, toxic-appearing or diaphoretic  Interventions: She is not intubated  HENT:      Head: Normocephalic and atraumatic  No abrasion, contusion or laceration  Right Ear: Hearing and external ear normal  No swelling or tenderness  Left Ear: Hearing and external ear normal  No swelling or tenderness  Nose: Nose normal  No nasal deformity, septal deviation, signs of injury, laceration or nasal tenderness  Mouth/Throat:      Mouth: Mucous membranes are moist       Pharynx: Oropharynx is clear  Eyes:      General: Lids are normal  Vision grossly intact  Extraocular Movements: Extraocular movements intact  Conjunctiva/sclera: Conjunctivae normal       Pupils: Pupils are equal, round, and reactive to light        Comments: Pupils were 3 equal, round and reactive bilaterally   Cardiovascular:      Rate and Rhythm: Normal rate and regular rhythm  Pulses: Normal pulses  Carotid pulses are 2+ on the right side and 2+ on the left side  Radial pulses are 2+ on the right side and 2+ on the left side  Femoral pulses are 2+ on the right side and 2+ on the left side  Dorsalis pedis pulses are 2+ on the right side and 2+ on the left side  Heart sounds: Normal heart sounds  No murmur heard  No friction rub  No gallop  Pulmonary:      Effort: Pulmonary effort is normal  No tachypnea, bradypnea, accessory muscle usage, prolonged expiration, respiratory distress or retractions  She is not intubated  Breath sounds: Normal breath sounds and air entry  No stridor or decreased air movement  No decreased breath sounds, wheezing, rhonchi or rales  Chest:      Chest wall: Tenderness (Mild right lower anterior lateral chest wall tenderness without crepitus or deformity ) present  Abdominal:      General: Abdomen is flat  Bowel sounds are normal  There is no distension  Palpations: Abdomen is soft  Tenderness: There is no abdominal tenderness  There is no guarding or rebound  Musculoskeletal:         General: No swelling, tenderness, deformity or signs of injury  Normal range of motion  Cervical back: Neck supple  No signs of trauma or tenderness  No spinous process tenderness or muscular tenderness  Right lower leg: Edema present  Left lower leg: Edema present  Comments: No midline cervical, thoracic or lumbar spine tenderness, step-offs or deformities  No paraspinal muscular tenderness in the neck or back  Normal range of motion in all 4 extremities without pain, tenderness or deformity  Skin:     General: Skin is warm and dry  Capillary Refill: Capillary refill takes less than 2 seconds  Coloration: Skin is not jaundiced or pale  Findings: No abrasion, bruising, ecchymosis, erythema, laceration, lesion, rash or wound     Neurological: General: No focal deficit present  Mental Status: She is alert and oriented to person, place, and time  Mental status is at baseline  GCS: GCS eye subscore is 4  GCS verbal subscore is 5  GCS motor subscore is 6  Sensory: Sensation is intact  No sensory deficit  Motor: Motor function is intact  No weakness  Psychiatric:         Mood and Affect: Mood normal          Behavior: Behavior is cooperative  Invasive Devices  Report    Peripheral Intravenous Line            Peripheral IV 01/28/22 Left Antecubital <1 day                Lab Results:   Results: I have personally reviewed pertinent reports   , BMP/CMP:   Lab Results   Component Value Date    SODIUM 139 01/28/2022    K 4 7 01/28/2022     01/28/2022    CO2 21 01/28/2022    BUN 20 01/28/2022    CREATININE 1 27 01/28/2022    CALCIUM 8 3 01/28/2022    AST 21 01/28/2022    ALT 23 01/28/2022    ALKPHOS 108 01/28/2022    EGFR 41 01/28/2022   , CBC:   Lab Results   Component Value Date    WBC 5 13 01/28/2022    HGB 9 0 (L) 01/28/2022    HCT 30 6 (L) 01/28/2022    MCV 86 01/28/2022     01/28/2022    MCH 25 3 (L) 01/28/2022    MCHC 29 4 (L) 01/28/2022    RDW 18 9 (H) 01/28/2022    MPV 10 9 01/28/2022    NRBC 0 01/28/2022    and Coagulation: No results found for: PT, INR, APTT  Imaging/EKG Studies: Results: I have personally reviewed pertinent reports      Other Studies: N/A    Code Status: No Order  Advance Directive and Living Will:      Power of :    POLST:

## 2022-01-28 NOTE — ED NOTES
Pt sitting up on side of bed  States pain is better and she is able to sit up   Pt states that she refuses to be treated for anything else      Kyle Elliott RN  01/28/22 0279

## 2022-01-28 NOTE — ED NOTES
Patient is resting comfortably  Family at bedside  Pt received pain medications states pain has decreased slightly   Drink and food offered      Kenny Rachel RN  01/28/22 [id number]

## 2022-01-28 NOTE — PROGRESS NOTES
[facility]        NAME: Shar Mandel is a 78 y o  female  : 1945    MRN: [id number]  DATE: 2022  TIME: 10:22 AM    Assessment and Plan   Closed fracture of multiple ribs of right side, initial encounter [S22 41XA]  1  Closed fracture of multiple ribs of right side, initial encounter  Transfer to other facility    CANCELED: XR ribs left w pa chest min 3 views   2  Weakness  Transfer to other facility         Patient Instructions     Patient Instructions   Transfer to ED for further evaluation of rib fracture and weakness  [address] [place] [address]        **Portions of the record may have been created with voice recognition software  Occasional wrong word or "sound a like" substitutions may have occurred due to the inherent limitations of voice recognition software  Read the chart carefully and recognize, using context, where substitutions have occurred  **     Chief Complaint     Chief Complaint   Patient presents with    Rib Injury     fall in home on Tue  complains of R rib pain  History of Present Illness     70yo female presents to clinic with complaints of right rib pain x 4 days  She fell in her home on Tuesday and hit her right chest on a wooden armrest of her couch  She denies head injury or LOC  She had immediate severe pain but was able to get up after a few minutes with some assistance  She reports feeling short of breath  She has been falling increasingly more over the past few weeks and says it is due to anemia and recently having COVID-19  She ambulates with a walker  Review of Systems     Review of Systems   Constitutional: Positive for fatigue  Negative for fever  Respiratory: Positive for shortness of breath  Negative for cough  Cardiovascular: Positive for chest pain  Negative for palpitations  Gastrointestinal: Negative for abdominal pain, blood in stool, nausea and vomiting     Musculoskeletal: Positive for myalgias  Negative for arthralgias and neck pain  Skin: Negative for color change and wound  Neurological: Positive for weakness  Negative for dizziness, syncope, facial asymmetry, speech difficulty, light-headedness, numbness and headaches  Psychiatric/Behavioral: Positive for dysphoric mood           Current Medications     Current Outpatient Medications:     albuterol (PROVENTIL HFA,VENTOLIN HFA) 90 mcg/act inhaler, Inhale 2 puffs every 6 (six) hours as needed, Disp: , Rfl:     Aspirin 81 MG CAPS, Take 81 mg by mouth daily  , Disp: , Rfl:     atorvastatin (LIPITOR) 20 mg tablet, Take 20 mg by mouth, Disp: , Rfl:     Calcium Carbonate-Vitamin D 600-200 MG-UNIT CAPS, Take 1 tablet by mouth 2 (two) times a day, Disp: , Rfl:     darbepoetin rhonda (ARANESP) 100 mcg/mL, Inject 75 mcg under the skin, Disp: , Rfl:     ferrous sulfate 325 (65 Fe) mg tablet, Take 325 mg by mouth daily with breakfast, Disp: , Rfl:     furosemide (LASIX) 20 mg tablet, Take 20 mg by mouth as needed, Disp: , Rfl:     PHENobarbital 30 mg tablet, Take 30 mg by mouth 2 (two) times a day, Disp: , Rfl:     phenytoin (DILANTIN) 100 mg ER capsule, Take 100 mg by mouth Three times a day, Disp: , Rfl:     polyethylene glycol (MiraLax) 17 g packet, Take 1 application by mouth, Disp: , Rfl:     Current Allergies     Allergies as of 01/28/2022 - Reviewed 01/28/2022   Allergen Reaction Noted    Carvedilol Other (See Comments) 08/21/2019    Iodinated diagnostic agents Angioedema 09/02/2011    Shellfish allergy - food allergy Angioedema 09/02/2011    Lisinopril Cough 08/21/2019            The following portions of the patient's history were reviewed and updated as appropriate: allergies, current medications, past family history, past medical history, past social history, past surgical history and problem list      Past Medical History:   Diagnosis Date    Anemia     Carotid stenosis     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)     Coronary artery disease     Essential hypertension     Ischemic cardiomyopathy     Mixed hyperlipidemia     Myelodysplastic syndrome (HCC)     Seizures (HCC)     Venous insufficiency        Past Surgical History:   Procedure Laterality Date    CORONARY ARTERY BYPASS GRAFT      x5       No family history on file  Medications have been verified  Objective     Pulse 87   Temp 98 6 °F (37 °C) (Temporal)   Resp 18   Ht 5' 5 5" (1 664 m)   Wt 75 3 kg (166 lb)   SpO2 100%   BMI 27 20 kg/m²        Physical Exam     Physical Exam  Vitals and nursing note reviewed  Constitutional:       General: She is not in acute distress  Appearance: She is not diaphoretic  HENT:      Head: Normocephalic and atraumatic  Eyes:      Extraocular Movements: Extraocular movements intact  Pupils: Pupils are equal, round, and reactive to light  Cardiovascular:      Rate and Rhythm: Normal rate and regular rhythm  Pulmonary:      Effort: Pulmonary effort is normal  No respiratory distress  Chest:      Chest wall: Tenderness present  No deformity or edema  Skin:     Coloration: Skin is not pale  Findings: No bruising or erythema  Neurological:      Mental Status: She is alert and oriented to person, place, and time  Psychiatric:         Mood and Affect: Mood is depressed

## 2022-01-28 NOTE — ASSESSMENT & PLAN NOTE
- Status post fall with the below noted injuries  - Fall precautions   - PT and OT evaluation and treatment as indicated  - Case Management consultation for disposition planning

## 2022-01-28 NOTE — PATIENT INSTRUCTIONS
Transfer to ED for further evaluation of rib fracture and weakness       [facility] Nanda Fernández [facility], [address] [facility]

## 2022-01-28 NOTE — Clinical Note
Case was discussed with Dr Ashutosh Morse (trauma) and the patient's admission status was agreed to be Admission Status: observation status to the service of Dr Ashutosh Morse

## 2022-01-28 NOTE — ASSESSMENT & PLAN NOTE
- Acute pain due to traumatic injuries  - Initiate multimodal analgesic regimen  - Initiate bowel regimen  - Monitor for adequate pain control and adjust regimen as indicated  Consider acute pain service consultation if pain and adequately controlled

## 2022-01-29 ENCOUNTER — APPOINTMENT (OUTPATIENT)
Dept: RADIOLOGY | Facility: HOSPITAL | Age: 77
End: 2022-01-29
Payer: COMMERCIAL

## 2022-01-29 VITALS
HEART RATE: 92 BPM | OXYGEN SATURATION: 100 % | SYSTOLIC BLOOD PRESSURE: 167 MMHG | RESPIRATION RATE: 18 BRPM | BODY MASS INDEX: 27.2 KG/M2 | WEIGHT: 166 LBS | TEMPERATURE: 98.1 F | DIASTOLIC BLOOD PRESSURE: 81 MMHG

## 2022-01-29 LAB
APTT PPP: 35 SECONDS (ref 23–37)
ERYTHROCYTE [DISTWIDTH] IN BLOOD BY AUTOMATED COUNT: 19.6 % (ref 11.6–15.1)
HCT VFR BLD AUTO: 30.7 % (ref 34.8–46.1)
HGB BLD-MCNC: 8.8 G/DL (ref 11.5–15.4)
INR PPP: 1.07 (ref 0.84–1.19)
MCH RBC QN AUTO: 24.8 PG (ref 26.8–34.3)
MCHC RBC AUTO-ENTMCNC: 28.7 G/DL (ref 31.4–37.4)
MCV RBC AUTO: 87 FL (ref 82–98)
PLATELET # BLD AUTO: 267 THOUSANDS/UL (ref 149–390)
PMV BLD AUTO: 9.6 FL (ref 8.9–12.7)
PROTHROMBIN TIME: 13.9 SECONDS (ref 11.6–14.5)
RBC # BLD AUTO: 3.55 MILLION/UL (ref 3.81–5.12)
WBC # BLD AUTO: 3.82 THOUSAND/UL (ref 4.31–10.16)

## 2022-01-29 PROCEDURE — 99217 PR OBSERVATION CARE DISCHARGE MANAGEMENT: CPT | Performed by: PHYSICIAN ASSISTANT

## 2022-01-29 PROCEDURE — NC001 PR NO CHARGE: Performed by: SURGERY

## 2022-01-29 PROCEDURE — 85730 THROMBOPLASTIN TIME PARTIAL: CPT | Performed by: PHYSICIAN ASSISTANT

## 2022-01-29 PROCEDURE — 85027 COMPLETE CBC AUTOMATED: CPT | Performed by: PHYSICIAN ASSISTANT

## 2022-01-29 PROCEDURE — 97163 PT EVAL HIGH COMPLEX 45 MIN: CPT

## 2022-01-29 PROCEDURE — 85610 PROTHROMBIN TIME: CPT | Performed by: PHYSICIAN ASSISTANT

## 2022-01-29 PROCEDURE — 97116 GAIT TRAINING THERAPY: CPT

## 2022-01-29 PROCEDURE — 36415 COLL VENOUS BLD VENIPUNCTURE: CPT | Performed by: PHYSICIAN ASSISTANT

## 2022-01-29 PROCEDURE — 99205 OFFICE O/P NEW HI 60 MIN: CPT | Performed by: INTERNAL MEDICINE

## 2022-01-29 RX ORDER — METHOCARBAMOL 500 MG/1
500 TABLET, FILM COATED ORAL EVERY 6 HOURS SCHEDULED
Status: DISCONTINUED | OUTPATIENT
Start: 2022-01-29 | End: 2022-01-29 | Stop reason: HOSPADM

## 2022-01-29 RX ORDER — GABAPENTIN 100 MG/1
100 CAPSULE ORAL 3 TIMES DAILY
Status: DISCONTINUED | OUTPATIENT
Start: 2022-01-29 | End: 2022-01-29 | Stop reason: HOSPADM

## 2022-01-29 RX ORDER — ACETAMINOPHEN 325 MG/1
650 TABLET ORAL EVERY 4 HOURS PRN
Refills: 0
Start: 2022-01-29

## 2022-01-29 RX ORDER — AMOXICILLIN 250 MG
1 CAPSULE ORAL 2 TIMES DAILY
Qty: 10 TABLET | Refills: 0 | Status: SHIPPED | OUTPATIENT
Start: 2022-01-29 | End: 2022-02-03

## 2022-01-29 RX ORDER — OXYCODONE HYDROCHLORIDE 5 MG/1
2.5-5 TABLET ORAL EVERY 4 HOURS PRN
Qty: 20 TABLET | Refills: 0 | Status: SHIPPED | OUTPATIENT
Start: 2022-01-29

## 2022-01-29 RX ORDER — LIDOCAINE 4 G/G
1 PATCH TOPICAL DAILY
Refills: 0
Start: 2022-01-29

## 2022-01-29 RX ORDER — METHOCARBAMOL 500 MG/1
500 TABLET, FILM COATED ORAL EVERY 6 HOURS SCHEDULED
Qty: 40 TABLET | Refills: 0 | Status: SHIPPED | OUTPATIENT
Start: 2022-01-29 | End: 2022-02-10 | Stop reason: SDUPTHER

## 2022-01-29 RX ADMIN — HEPARIN SODIUM 5000 UNITS: 5000 INJECTION INTRAVENOUS; SUBCUTANEOUS at 05:49

## 2022-01-29 RX ADMIN — ACETAMINOPHEN 975 MG: 325 TABLET, FILM COATED ORAL at 14:10

## 2022-01-29 RX ADMIN — METHOCARBAMOL 250 MG: 500 TABLET ORAL at 00:14

## 2022-01-29 RX ADMIN — PHENYTOIN SODIUM 100 MG: 100 CAPSULE ORAL at 05:48

## 2022-01-29 RX ADMIN — PHENYTOIN SODIUM 100 MG: 100 CAPSULE ORAL at 14:10

## 2022-01-29 RX ADMIN — ACETAMINOPHEN 975 MG: 325 TABLET, FILM COATED ORAL at 05:48

## 2022-01-29 RX ADMIN — OXYCODONE HYDROCHLORIDE 5 MG: 5 TABLET ORAL at 13:25

## 2022-01-29 RX ADMIN — METHOCARBAMOL 250 MG: 500 TABLET ORAL at 05:48

## 2022-01-29 RX ADMIN — ASPIRIN 81 MG CHEWABLE TABLET 81 MG: 81 TABLET CHEWABLE at 09:54

## 2022-01-29 RX ADMIN — GABAPENTIN 100 MG: 100 CAPSULE ORAL at 09:54

## 2022-01-29 RX ADMIN — DOCUSATE SODIUM AND SENNOSIDES 1 TABLET: 8.6; 5 TABLET, FILM COATED ORAL at 09:54

## 2022-01-29 RX ADMIN — OXYCODONE HYDROCHLORIDE 5 MG: 5 TABLET ORAL at 00:18

## 2022-01-29 RX ADMIN — HYDROMORPHONE HYDROCHLORIDE 0.2 MG: 1 INJECTION, SOLUTION INTRAMUSCULAR; INTRAVENOUS; SUBCUTANEOUS at 09:08

## 2022-01-29 RX ADMIN — FERROUS SULFATE TAB 325 MG (65 MG ELEMENTAL FE) 325 MG: 325 (65 FE) TAB at 09:57

## 2022-01-29 RX ADMIN — PHENOBARBITAL 32.4 MG: 32.4 TABLET ORAL at 09:54

## 2022-01-29 RX ADMIN — METHOCARBAMOL 500 MG: 500 TABLET ORAL at 13:25

## 2022-01-29 NOTE — PROGRESS NOTES
[facility]  Progress Note - Verna Nash 1945, 78 y o  female MRN: [id number]  Unit/Bed#: ED 15 Encounter: [id number]  Primary Care Provider: Donna Dykes PA-C   Date and time admitted to hospital: 1/28/2022 11:03 AM    * Closed fracture of multiple ribs  Assessment & Plan  - Multiple right-sided rib fractures (7-8), present on admission   - Continue rib fracture protocol   - Continue to encourage incentive spirometer use and adequate pulmonary hygiene  Currently pulling 1200 on IS  - Continue multimodal analgesic regimen  - May provide supplemental oxygen via nasal cannula as needed to maintain saturations greater than or equal to 94% if necessary  Patient currently on room air and maintaining oxygen saturations appropriately  - Repeat chest x-ray on 01/29/2022    - PT and OT evaluation and treatment as indicated  Myelodysplastic syndrome ([facility] [address] )  Assessment & Plan  - Chronic history of mild dysplastic syndrome with no acute issues  - Continue home medication regimen as indicated  - Outpatient follow-up with PCP and Hematologist     History of seizure disorder  Assessment & Plan  - History of seizure disorder with no recent seizures  - Continue home medication regimen   - Outpatient follow-up PCP  CAD (coronary artery disease)  Assessment & Plan  - Patient with history of CAD and prior CABG with no evidence of acute coronary syndrome at this time  - Continue home medication regimen   - Outpatient follow-up with PCP  Acute pain due to trauma  Assessment & Plan  - Acute pain due to traumatic injuries  - Initiate multimodal analgesic regimen  - Initiate bowel regimen  - Monitor for adequate pain control and adjust regimen as indicated  Consider acute pain service consultation if pain and adequately controlled  Fall  Assessment & Plan  - Status post fall with the below noted injuries    - Fall precautions   - PT and OT evaluation and treatment as indicated  - Case Management consultation for disposition planning  TERTIARY TRAUMA SURVEY NOTE    Prophylaxis: Sequential compression device (Venodyne)  and Heparin    Disposition:  Continue med surge level of care, disposition pending pain control, PT and OT evaluations  Code status:  Level 1 - Full Code    Consultants: ROSETTE      SUBJECTIVE:     Transfer from: None  Mechanism of Injury:Fall    Chief Complaint: "Im in so much pain"    HPI/Last 24 hour events: Patient reports her right rib pain has progressed and the pain pills hardly touch it       Active medications:           Current Facility-Administered Medications:     acetaminophen (TYLENOL) tablet 975 mg, 975 mg, Oral, Q8H [address], 975 mg at 01/29/22 0548    albuterol (PROVENTIL HFA,VENTOLIN HFA) inhaler 2 puff, 2 puff, Inhalation, Q6H PRN    aspirin chewable tablet 81 mg, 81 mg, Oral, Daily    atorvastatin (LIPITOR) tablet 20 mg, 20 mg, Oral, Daily With Dinner, 20 mg at 01/28/22 1705    calcium carbonate-vitamin D (OSCAL-D) 500 mg-200 units per tablet 1 tablet, 1 tablet, Oral, Daily With Breakfast    ferrous sulfate tablet 325 mg, 325 mg, Oral, BID With Meals, 325 mg at 01/28/22 1704    heparin (porcine) subcutaneous injection 5,000 Units, 5,000 Units, Subcutaneous, Q8H [address], 5,000 Units at 01/29/22 0549 **AND** [CANCELED] Platelet count, , , Once    HYDROmorphone (DILAUDID) injection 0 2 mg, 0 2 mg, Intravenous, Q4H PRN, 0 2 mg at 01/28/22 2009    lidocaine (LIDODERM) 5 % patch 1 patch, 1 patch, Topical, Daily    methocarbamol (ROBAXIN) tablet 250 mg, 250 mg, Oral, Q6H SHRUTHI, 250 mg at 01/29/22 0548    naloxone (NARCAN) 0 04 mg/mL syringe 0 04 mg, 0 04 mg, Intravenous, Q1MIN PRN    ondansetron (ZOFRAN) injection 4 mg, 4 mg, Intravenous, Q4H PRN    oxyCODONE (ROXICODONE) IR tablet 2 5 mg, 2 5 mg, Oral, Q4H PRN    oxyCODONE (ROXICODONE) IR tablet 5 mg, 5 mg, Oral, Q4H PRN, 5 mg at 01/29/22 0018    PHENobarbital tablet 32 4 mg, 32 4 mg, Oral, BID, 32 4 mg at 01/28/22 1702    phenytoin (DILANTIN) ER capsule 100 mg, 100 mg, Oral, Q8H SHRUTHI, 100 mg at 01/29/22 0548    polyethylene glycol (MIRALAX) packet 17 g, 17 g, Oral, Daily PRN, 17 g at 01/28/22 1825    senna-docusate sodium (SENOKOT S) 8 6-50 mg per tablet 1 tablet, 1 tablet, Oral, BID, 1 tablet at 01/28/22 1704    Current Outpatient Medications:     albuterol (PROVENTIL HFA,VENTOLIN HFA) 90 mcg/act inhaler    Aspirin 81 MG CAPS    atorvastatin (LIPITOR) 20 mg tablet    Calcium Carbonate-Vitamin D 600-200 MG-UNIT CAPS    darbepoetin rhonda (ARANESP) 100 mcg/mL    ferrous sulfate 325 (65 Fe) mg tablet    furosemide (LASIX) 20 mg tablet    PHENobarbital 30 mg tablet    phenytoin (DILANTIN) 100 mg ER capsule    polyethylene glycol (MiraLax) 17 g packet      OBJECTIVE:     Vitals:   Vitals:    01/29/22 0400   BP: 130/61   Pulse: 78   Resp: 18   Temp:    SpO2: 100%       Physical Exam:   GENERAL APPEARANCE: Patient in no acute distress  HEENT: NCAT; PERRL, EOMs intact; Mucous membranes moist  NECK / BACK: Nontender, ROM intact  CV: Regular rate and rhythm; no murmur/gallops/rubs appreciated  CHEST / LUNGS: Clear to auscultation; no wheezes/rales/rhonci  Anterior right sided chest wall tenderness  ABD: NABS; soft; non-distended; non-tender  : Voiding  EXT: +2 pulses bilaterally upper & lower extremities; bilateral LE edema  NEURO: GCS 15; no focal neurologic deficits; neurovascularly intact  SKIN: Warm, dry and well perfused; no rash; no jaundice  1  Before the illness or injury that brought you to the Emergency, did you need someone to help you on a regular basis? 1=Yes   2  Since the illness or injury that brought you to the Emergency, have you needed more help than usual to take care of yourself? 1=Yes   3  Have you been hospitalized for one or more nights during the past 6 months (excluding a stay in the Emergency Department)? 0=No   4  In general, do you see well? 1=No   5   In general, do you have serious problems with your memory? 1=Yes   6  Do you take more than three different medications everyday? 1=Yes   TOTAL   5     Did you order a geriatric consult if the score was 2 or greater?: yes         PIC Score  PIC Pain Score: 1 (1/29/2022 12:18 AM)  PIC Incentive Spirometry Score: 3 (1/29/2022 12:00 AM)  PIC Cough Description: 3 (1/29/2022 12:00 AM)       If the Total PIC Score </=5, did you consult APS and evaluate patient for further intervention?: yes        Pain:    Incentive Spirometry  Cough  3 = Controlled  4 = Above goal volume 3 = Strong  2 = Moderate  3 = Goal to alert volume 2 = Weak  1 = Severe  2 = Below alert volume 1 = Absent     1 = Unable to perform IS           I/O:   I/O     None          Invasive Devices:    Invasive Devices  Report    Peripheral Intravenous Line            Peripheral IV 01/28/22 Left Antecubital <1 day                  Imaging:   XR ribs right w pa chest min 3 views    Result Date: 1/28/2022  Impression: Acute mildly displaced fractures involving the right sixth and seventh ribs Workstation performed: [id number]       Labs:   CBC:   Lab Results   Component Value Date    WBC 3 82 (L) 01/29/2022    HGB 8 8 (L) 01/29/2022    HCT 30 7 (L) 01/29/2022    MCV 87 01/29/2022     01/29/2022    MCH 24 8 (L) 01/29/2022    MCHC 28 7 (L) 01/29/2022    RDW 19 6 (H) 01/29/2022    MPV 9 6 01/29/2022    NRBC 0 01/28/2022     CMP:   Lab Results   Component Value Date     01/28/2022    CO2 21 01/28/2022    BUN 20 01/28/2022    CREATININE 1 27 01/28/2022    CALCIUM 8 3 01/28/2022    AST 21 01/28/2022    ALT 23 01/28/2022    ALKPHOS 108 01/28/2022    EGFR 41 01/28/2022

## 2022-01-29 NOTE — DISCHARGE SUMMARY
[facility]  Discharge- [address] [place] [address] 1945, 78 y o  female MRN: [id number]  Unit/Bed#: ED 15 Encounter: [id number]  Primary Care Provider: Gorge Caballero PA-C   Date and time admitted to hospital: 1/28/2022 11:03 AM    Fall  Assessment & Plan  - Status post fall with the below noted injuries  - Fall precautions   - PT and OT evaluation and treatment as indicated  - Case Management consultation for disposition planning  * Closed fracture of multiple ribs  Assessment & Plan  - Multiple right-sided rib fractures (6-7), present on admission   - Continue rib fracture protocol   - Continue to encourage incentive spirometer use and adequate pulmonary hygiene  Currently pulling 1200 on IS  - Continue multimodal analgesic regimen   - Patient remained on room air and maintaining oxygen saturations appropriately  - PT and OT evaluation and treatment as indicated  Recommended for home PT services  - Case Management assisted with disposition planning   - Outpatient follow-up in the trauma clinic in 2 weeks for re-evaluation  Acute pain due to trauma  Assessment & Plan  - Acute pain due to traumatic injuries  - Continue multimodal analgesic regimen   - Continue bowel regimen  CAD (coronary artery disease)  Assessment & Plan  - Patient with history of CAD and prior CABG with no evidence of acute coronary syndrome at this time  - Continue home medication regimen   - Outpatient follow-up with PCP  History of seizure disorder  Assessment & Plan  - History of seizure disorder with no recent seizures  - Continue home medication regimen   - Outpatient follow-up PCP  Myelodysplastic syndrome ([facility] [address] )  Assessment & Plan  - Chronic history of mild dysplastic syndrome with no acute issues  - Continue home medication regimen as indicated    - Outpatient follow-up with PCP and Hematologist         Discharge Summary - Trauma Service   Aliya Landis 78 y o  female MRN: [id number]  Unit/Bed#: ED 15 Encounter: [id number]    Admission Date: 1/28/2022     Discharge Date: 1/29/2022    Admitting Diagnosis: No admission diagnoses are documented for this encounter  Discharge Diagnosis: See above  Attending and Service: Dr Jaiden Curiel, Acute Care Surgical Services  Consulting Physician(s):     1  APS  Imaging and Procedures Performed:   Orders Placed This Encounter   Procedures    ED ECG Documentation Only       XR ribs right w pa chest min 3 views    Result Date: 1/28/2022  Impression: Acute mildly displaced fractures involving the right sixth and seventh ribs Workstation performed: Bhavna Course: Haley Anaya is a 79-year-old female presented after 2 falls over the last 2 weeks complaining of right-sided chest wall pain she was found to have multiple right-sided rib fractures  She also noted some increased dyspnea on exertion, but noted some baseline dyspnea on exertion since suffering from [place] last fall  On her initial trauma evaluation, her primary survey was unremarkable  On secondary survey, she was hypertensive with normal vital signs otherwise; she had right-sided chest wall tenderness without crepitus or deformity; she had mild edema of the bilateral lower extremities; the remainder of her exam was unremarkable  Her initial workup including labs in the above-noted imaging studies was reviewed by the trauma service  She was admitted to the trauma service with multiple right-sided rib fractures and placed on the rib fracture protocol as well as a multimodal analgesic regimen and bowel regimen  She was instructed on incentive spirometer use and adequate pulmonary hygiene  PT and OT were consulted and recommended home health therapy services and a new roller walker  Case Management assisted with disposition planning    She was deemed stable for discharge on 01/29/2022 after continued to do well with her incentive spirometer and having adequate pain control with oral analgesics  For further details of her hospital encounter, please see her complete medical records  On discharge, the patient is instructed to follow-up with the patient's primary care provider to review the events of the patient's recent hospitalization  The patient is instructed to follow-up in the Trauma Clinic as scheduled on 2/10/2022 at 12:30 PM   The patient should follow the provided discharge instructions  Condition at Discharge: good     Discharge instructions/Information to patient and family:   See after visit summary for information provided to patient and family  Provisions for Follow-Up Care:  See after visit summary for information related to follow-up care and any pertinent home health orders  Disposition: See After Visit Summary for discharge disposition information  Planned Readmission: No    Discharge Statement   I spent 25 minutes discharging the patient  This time was spent on the day of discharge  I had direct contact with the patient on the day of discharge  Additional documentation is required if more than 30 minutes were spent on discharge  Discharge Medications:  See after visit summary for reconciled discharge medications provided to patient and family        Rosa Maria Roger PA-C  1/29/2022   12:31 PM

## 2022-01-29 NOTE — CASE MANAGEMENT
Case Management Discharge Planning Note    Patient name Butch Ybarra  Location ED 15/ED 15 MRN [id number]  : 1945 Date 2022       Current Admission Date: 2022  Current Admission Diagnosis:Closed fracture of multiple ribs   Patient Active Problem List    Diagnosis Date Noted    Fall 2022    Closed fracture of multiple ribs 2022    Acute pain due to trauma 2022    CAD (coronary artery disease) 2022    History of seizure disorder 2022    Myelodysplastic syndrome ([facility] [address] ) 2022      LOS (days): 0  Geometric Mean LOS (GMLOS) (days):   Days to GMLOS:     OBJECTIVE:      Current admission status: Observation   Preferred Pharmacy:   [address] [place] [address],[address], [address] [facility] [address] [place] [address]  Phone: [phone] Fax: [phone]    Primary Care Provider: Екатерина Muro PA-C    Primary Insurance: Gaby Jerez [facility]  Secondary Insurance:     DISCHARGE DETAILS:    Discharge planning discussed with[de-identified] patient  Freedom of Choice: Yes     CM contacted family/caregiver?: No- see comments (Patient declined)  Were Treatment Team discharge recommendations reviewed with patient/caregiver?: Yes  Did patient/caregiver verbalize understanding of patient care needs?: Yes  Were patient/caregiver advised of the risks associated with not following Treatment Team discharge recommendations?: Yes         [address]         Is the patient interested in [facility] at discharge?: Yes  [address] requested[de-identified] Physical [address] Name[de-identified]  (Delories Shade)  [address] [place] [address] Provider[de-identified] PCP  Home Health Services Needed[de-identified] Evaluate Functional Status and Safety,Gait/ADL Training,Strengthening/Theraputic Exercises to Improve Function  Homebound Criteria Met[de-identified] Uses an Assist Device (i e  cane, walker, etc)  Supporting Clincal Findings[de-identified] Limited Endurance    DME Referral Provided  Referral made for DME?: Yes  DME referral completed for the following items[de-identified] Ryan Popns  DME Supplier Name[de-identified] [employer]    Other Referral/Resources/Interventions Provided:  Interventions: C  Referral Comments: [facility]  Referral to [address] for RW done-RW delivered to patient at bedside    Treatment Team Recommendation: Home with [facility]  Discharge Destination Plan[de-identified] Home with [place] at Discharge : Free Local Transportation  Dispatcher Contacted: Yes  Number/Name of Dispatcher: [facility]  [facility]  ETA of Transport (Date): 01/29/22     CM delivered RW to the bedside  Patient signed delivery ticket and provided copy at the bedside  Patient updated [facility] is able to [facility] 2/2 and [facility] is able to [facility] 2/3  Patient prefers [facility] that can come out the soonest      CM sent update to [facility]  CM will fax AVS and F2F to [facility] for Home PT     CM called MARY JO  Spoke with Michael Dunn  Patient set up with MARY JO for ride home  CM provided number

## 2022-01-29 NOTE — ASSESSMENT & PLAN NOTE
- Acute pain due to traumatic injuries  - Continue multimodal analgesic regimen   - Continue bowel regimen

## 2022-01-29 NOTE — ED NOTES
Spoke with trauma  Waiting for case management to set up transport home  PACS was called to inform them pt does not need bed upstairs        Dagoberto Chin RN  01/29/22 0432

## 2022-01-29 NOTE — PLAN OF CARE
Problem: PHYSICAL THERAPY ADULT  Goal: Performs mobility at highest level of function for planned discharge setting  See evaluation for individualized goals  Description: Treatment/Interventions: Functional transfer training,LE strengthening/ROM,Therapeutic exercise,Endurance training,Patient/family training,Equipment eval/education,Bed mobility,Gait training  Equipment Recommended: Yrn Martinez       See flowsheet documentation for full assessment, interventions and recommendations  Outcome: Progressing  Note: Prognosis: Good  Problem List: Decreased strength,Decreased range of motion,Decreased endurance,Impaired balance,Decreased mobility,Decreased safety awareness,Pain (LE edema)  Assessment: Pt presents with right-sided chest wall pain following 2 recent falls  Dx: right 6th and 7th rib fxs  order placed for PT eval and tx, w/ activity order of up w/ A and up in chair  pt presents w/ comorbidities of COVID-19, anemia, carotid stenosis, CAD, ICMP, HTN, hyperlipidemia, and seizures and personal factors of advanced age, mobilizing w/ assistive device, limited home support, positive fall history and inability to perform IADLs  pt presents w/ pain, weakness, decreased ROM, decreased endurance, impaired balance, gait deviations, decreased safety awareness, fall risk and LE edema  these impairments are evident in findings from physical examination (weakness, decreased ROM and edema of extremities), mobility assessment (need for standby to min assist w/ all phases of mobility when usually mobilizing independently, tolerance to only 60 feet of ambulation and need for cueing for mobility technique), and Barthel Index: 60/100  pt needed input for mobility and breathing technique  pt is at risk for falls due to physical and safety awareness deficits   pt's clinical presentation is unstable/unpredictable (evident in poor blood pressure control, need for assist w/ all phases of mobility when usually mobilizing independently, tolerance to only 60 feet of ambulation, pain impacting overall mobility status and need for input for mobility technique/safety)  pt needs inpatient PT tx to improve mobility deficits and progress mobility training as appropriate  discharge recommendation is for home w/ family support and home PT to reduce fall risk and maximize level of functional independence  PT Discharge Recommendation: Home with home health rehabilitation          See flowsheet documentation for full assessment

## 2022-01-29 NOTE — DISCHARGE INSTRUCTIONS
Traumatic Rib Fracture Discharge Instructions: Your rib fractures will take time to heal  Rib fractures typically take at least 6-8 weeks to heal and may take longer  Activity:  - Continue PT evaluation and treatment as indicated  - Walking and normal light activities are encouraged  Normal daily activities including climbing steps are okay  - Avoid lifting greater than 10 pounds, any strenuous activities and/or exercise, and contact sports until cleared by the trauma service  - Continue using the incentive spirometer at least 10 times every hour while awake  Return to work:    - You may return to work once you are cleared by the trauma service  Medications:    - You should continue your current medication regimen after discharge unless otherwise instructed  Please refer to your discharge medication list for further details  - Please take the pain medications as directed  - You are encouraged to use non-narcotic pain medications first and whenever possible  Reserve the use of narcotic pain medication for moderate to severe pain not controlled by non-narcotic medications   - No driving while taking narcotic pain medications  - You may become constipated, especially if taking pain medications  You may take any over the counter stool softeners or laxatives as needed  Examples: Milk of Magnesia, Colace, Senna  Additional Instructions:  - If you have any questions or concerns after discharge please call the office   - Call office or return to ER if fever greater than 101, chills, worsening/uncontrollable pain, develop productive cough, increasing shortness of breath, and/or difficulty breathing

## 2022-01-29 NOTE — ED NOTES
Per trauma, pt will be d/c  Case management arranging home pt, and transport home  Waiting on confirmation [employer] before giving pt bed away        Aurora Godfrey RN  01/29/22 8052

## 2022-01-29 NOTE — ASSESSMENT & PLAN NOTE
- Multiple right-sided rib fractures (7-8), present on admission   - Continue rib fracture protocol   - Continue to encourage incentive spirometer use and adequate pulmonary hygiene  Currently pulling 1200 on IS  - Continue multimodal analgesic regimen  - May provide supplemental oxygen via nasal cannula as needed to maintain saturations greater than or equal to 94% if necessary  Patient currently on room air and maintaining oxygen saturations appropriately  - Repeat chest x-ray on 01/29/2022    - PT and OT evaluation and treatment as indicated

## 2022-01-29 NOTE — CONSULTS
Rib Fracture Consultation - Acute Pain Service   Carlo Landis 78 y o  female MRN: [id number]  Unit/Bed#: ED 15 Encounter: [id number]               Assessment/Plan     Assessment:   Patient Active Problem List   Diagnosis    Fall    Closed fracture of multiple ribs    Acute pain due to trauma    CAD (coronary artery disease)    History of seizure disorder    Myelodysplastic syndrome ([facility] [address] )      Candelario Sacks is a 78y o  year old female s/p fall with R 7-8 fractures  Pt's pain controlled at rest   She does endorse some sharp pain with moving  This is improving since she started taking pain medications today- she had xavier reluctant to take them  Discussed with pt that she can continue to use them sparingly but will likely need some opiates for a short period of time to help with pain  From a respiratory standpoint, she is satting well on RA and able to achieve >1250 on IS  Plan:   - Recommend geriatric oral opioid regimen with oxycodone 2 5 mg/5 mg PO q4hrs PRN for moderate/severe pain, Dilaudid 0 2 mg IV q2hrs PRN for breakthrough pain  - Multimodal analgesia with:  - Tylenol 975 mg PO q8hrs standing  - Gabapentin 100 mg PO TID  - Robaxin 500 mg PO q6hrs   - Lidocaine patches to affected areas 12 hours on, 12 hours off  Recommended interventions: none    APS will sign off at this time  Thank you for the consult  All opioids and other analgesics to be written at discretion of primary team  Please contact Acute Pain Service - [facility] via [employer] from [phone] with additional questions or concerns  See Ximena or Ellen for additional contacts and after hours information  Plan discussed with primary team    History of Present Illness    Admit Date:  1/28/2022  Hospital Day:  0 days  Primary Service:  Trauma  Attending Provider:  Joaquina London MD  Reason for Consult / Principal Problem: rib fractures  HPI: Candelario Sacks is a 78 y o  female who presents after fall with R 7-8 fractures  Pt's pain controlled at rest   She does endorse some sharp pain with moving  This is improving since she started taking pain medications today- she had xavier reluctant to take them  From a respiratory standpoint, she is satting well on RA and able to achieve >1250 on IS  Rib Fracture Evaluation:  Injuries: R 7-8 rib fractures  Chest tube: none  Respiratory Co-morbidities: none  SpO2:   SPO2 RA Rest      ED from 1/28/2022 in [facility] Emergency Department   SpO2 100 %   SpO2 Activity At Rest   O2 Device None (Room air)   O2 Flow Rate --        Incentive Spirometer: >1250 mL  Platelet Count:   Results from last 7 days   Lab Units 01/29/22  0548   PLATELETS Thousands/uL 267     Coags:   Results from last 7 days   Lab Units 01/29/22  0548   INR  1 07   PROTIME seconds 13 9     Home anticoagulants: none  DVT prophylaxis: Heparin 5000 units SQ TID last dose 1/29 600    Current pain location(s): right chest  Pain Scale: 5/10  Quality: sharp  Current Analgesic regimen:  Tylenol, robaxin, lido, ayan, oxy, dilaudid    Pain History:   Pain Management Provider:      I have reviewed the patient's controlled substance dispensing history in the Prescription Drug Monitoring Program in compliance with the [facility] regulations before prescribing any controlled substances  Consults    Review of Systems   Constitutional: Negative  Negative for fever  HENT: Negative for congestion  Respiratory: Negative for shortness of breath  Cardiovascular:        Right chest pain   Gastrointestinal: Negative for abdominal pain  Endocrine: Negative  Genitourinary: Negative  Musculoskeletal: Negative  Skin: Negative  Neurological: Negative for syncope  Psychiatric/Behavioral: Negative          Historical Information   Past Medical History:   Diagnosis Date    Anemia     Carotid stenosis     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)     Coronary artery disease     Essential hypertension     Ischemic cardiomyopathy     Mixed hyperlipidemia     Myelodysplastic syndrome (HCC)     Seizures (HCC)     Venous insufficiency      Past Surgical History:   Procedure Laterality Date    CORONARY ARTERY BYPASS GRAFT      x5     Social History   Social History     Substance and Sexual Activity   Alcohol Use Not Currently     Social History     Substance and Sexual Activity   Drug Use Never     Social History     Tobacco Use   Smoking Status Former Smoker    Types: Cigarettes   Smokeless Tobacco Never Used     Family History: History reviewed  No pertinent family history      Meds/Allergies   current meds:   Current Facility-Administered Medications   Medication Dose Route Frequency    acetaminophen (TYLENOL) tablet 975 mg  975 mg Oral Q8H [facility]    albuterol (PROVENTIL HFA,VENTOLIN HFA) inhaler 2 puff  2 puff Inhalation Q6H PRN    aspirin chewable tablet 81 mg  81 mg Oral Daily    atorvastatin (LIPITOR) tablet 20 mg  20 mg Oral Daily With Dinner    calcium carbonate-vitamin D (OSCAL-D) 500 mg-200 units per tablet 1 tablet  1 tablet Oral Daily With Breakfast    ferrous sulfate tablet 325 mg  325 mg Oral BID With Meals    gabapentin (NEURONTIN) capsule 100 mg  100 mg Oral TID    heparin (porcine) subcutaneous injection 5,000 Units  5,000 Units Subcutaneous Q8H [facility]    HYDROmorphone (DILAUDID) injection 0 2 mg  0 2 mg Intravenous Q4H PRN    methocarbamol (ROBAXIN) tablet 500 mg  500 mg Oral Q6H [facility]    naloxone (NARCAN) 0 04 mg/mL syringe 0 04 mg  0 04 mg Intravenous Q1MIN PRN    ondansetron (ZOFRAN) injection 4 mg  4 mg Intravenous Q4H PRN    oxyCODONE (ROXICODONE) IR tablet 2 5 mg  2 5 mg Oral Q4H PRN    oxyCODONE (ROXICODONE) IR tablet 5 mg  5 mg Oral Q4H PRN    PHENobarbital tablet 32 4 mg  32 4 mg Oral BID    phenytoin (DILANTIN) ER capsule 100 mg  100 mg Oral Q8H [facility]    polyethylene glycol (MIRALAX) packet 17 g  17 g Oral Daily PRN    senna-docusate sodium (SENOKOT S) 8 6-50 mg per tablet 1 tablet  1 tablet Oral BID       Allergies   Allergen Reactions    Carvedilol Other (See Comments)     Orthostatic Hypotension    Iodinated Diagnostic Agents Angioedema    Shellfish Allergy - Food Allergy Angioedema    Lisinopril Cough       Objective   HR:  [76-80] 78  Resp:  [18] 18  BP: (129-181)/(60-94) 130/61  No intake or output data in the 24 hours ending 01/29/22 1239    Physical Exam  Vitals reviewed  Constitutional:       General: She is not in acute distress  HENT:      Head: Normocephalic  Mouth/Throat:      Mouth: Mucous membranes are moist    Eyes:      Extraocular Movements: Extraocular movements intact  Cardiovascular:      Rate and Rhythm: Normal rate  Pulmonary:      Effort: No respiratory distress  Comments: Right chest wall pain  Musculoskeletal:      Cervical back: Normal range of motion  Skin:     General: Skin is warm and dry  Neurological:      General: No focal deficit present  Mental Status: She is alert and oriented to person, place, and time  Psychiatric:         Mood and Affect: Mood normal          Behavior: Behavior normal          Lab Results:   CBC:   Lab Results   Component Value Date    WBC 3 82 (L) 01/29/2022    HGB 8 8 (L) 01/29/2022    HCT 30 7 (L) 01/29/2022    MCV 87 01/29/2022     01/29/2022    MCH 24 8 (L) 01/29/2022    MCHC 28 7 (L) 01/29/2022    RDW 19 6 (H) 01/29/2022    MPV 9 6 01/29/2022   , BMP:No results found for: SODIUM, K, CL, CO2, BUN, CREATININE, GLUC, GLUF, CALCIUM, AGAP, EGFR, PT/PTT:  Lab Results   Component Value Date    PTT 35 01/29/2022       Imaging Studies: Xray 1/28 R 7-8 rib fractures      Please note that the APS provides consultative services regarding pain management only    With the exception of ketamine, peripheral nerve catheters, and epidural infusions (and except when indicated), final decisions regarding starting or changing doses of analgesic medications are at the discretion of the consulting service  Off hours consultation and/or medication management is generally not available      Negrita Bravo MD  Acute Pain Service

## 2022-01-29 NOTE — CASE MANAGEMENT
Case Management Discharge Planning Note    Patient name Ellyn Flank  Location ED 15/ED 15 MRN [id number]  : 1945 Date 2022       Current Admission Date: 2022  Current Admission Diagnosis:Closed fracture of multiple ribs   Patient Active Problem List    Diagnosis Date Noted    Fall 2022    Closed fracture of multiple ribs 2022    Acute pain due to trauma 2022    CAD (coronary artery disease) 2022    History of seizure disorder 2022    Myelodysplastic syndrome ([facility] [address] ) 2022      LOS (days): 0  Geometric Mean LOS (GMLOS) (days):   Days to GMLOS:     OBJECTIVE:            Current admission status: Observation   Preferred Pharmacy:   [address] [place] [address],[address], [address] [place] [address]  Phone: [phone] Fax: [phone]    Primary Care Provider: Jerald Rees PA-C    Primary Insurance: Devra Goldmann [facility] REP  Secondary Insurance:     DISCHARGE DETAILS:  CM spoke with patient at the bedside  CM introduced self and role  CM discussed [facility] at discharge for Home PT  Patient educated on [facility]  Patient requesting Home PT at discharge  No preference noted for VNA  CM discussed sending blanket referral to VNA  CM will f/u with options  Patient requesting RW at discharge  No DME preference noted  Patient requesting transportation home  No KIMBERLY home  Patient declined CM updating family/friend at this time  All questions/concerns answered  CM sent referral for [facility] Home PT  Waiting for options  CM sent referral via parachute to [address] for RW  Waiting for review  Patient confirmed discharge address is [address]

## 2022-01-29 NOTE — ASSESSMENT & PLAN NOTE
- Multiple right-sided rib fractures (6-7), present on admission   - Continue rib fracture protocol   - Continue to encourage incentive spirometer use and adequate pulmonary hygiene  Currently pulling 1200 on IS  - Continue multimodal analgesic regimen   - Patient remained on room air and maintaining oxygen saturations appropriately  - PT and OT evaluation and treatment as indicated  Recommended for home PT services  - Case Management assisted with disposition planning   - Outpatient follow-up in the trauma clinic in 2 weeks for re-evaluation

## 2022-01-29 NOTE — RESPIRATORY THERAPY NOTE
Rt called down to help pt with IS per rib fracture protocol  Pt instructed on proper technique and tolerated well  Pt goal is 750 and pt met goal  Pt on RA with sats of 99% and no respiratory distress  Will continue to monitor

## 2022-01-29 NOTE — UTILIZATION REVIEW
Initial Clinical Review    Admission: Date/Time/Statement:   Admission Orders (From admission, onward)     Ordered        01/28/22 1507  Place in Observation  Once                      Orders Placed This Encounter   Procedures    Place in Observation     Standing Status:   Standing     Number of Occurrences:   1     Order Specific Question:   Level of Care     Answer:   Med Surg [16]     Order Specific Question:   Bed Type     Answer:   Trauma [7]     ED Arrival Information     Expected Arrival Acuity    - 1/28/2022 10:53 Urgent         Means of arrival Escorted by Service Admission type    Walk-In Self Trauma Urgent         Arrival complaint    Closed fracture of multiple ribs of right side, initial encounter +Aspirin        Chief Complaint   Patient presents with   Nik [facility] Fall     PT presents after being seen at urgent care for a fall 4 days ago  PT fell on right side fracturing a few ribs  PT c/o rib pain and SOB  PT denies headstrike or LOC but does take ASA daily       Initial Presentation:  77 yo female presents to ED from urgent care due to right side chest pain following 2 recent falls at home  Matt Moscoso 2 weeks ago and again this Sunday striking right side on wooden arm of chair  Admits to some difficulty breathing  Reports chronic RASCON since COVID infection last fall  Xrays reveal right side rib fracture 7-8 Exam notes elevated /88 GCS 15, mild right lower ACW tenderness without crepitus normal breath sounds  Labs find h/h 9/30 6, history CAD, seizure disorder, myelodysplastic syndrome   Admit as observation to med surg for fall with closed fracture multiple ribs, acute pain Plan Encourage IS, repeat CXR 1/29, PT/OT eval and treat, provide supplemental O2 as needed to maintain sat 94% , initiate multimodal pain management regimen Continue home meds     ED Triage Vitals [01/28/22 1111]   Temperature Pulse Respirations Blood Pressure SpO2   98 1 °F (36 7 °C) 91 22 (!) 209/88 100 %      Temp Source Heart Rate Source Patient Position - Orthostatic VS BP Location FiO2 (%)   Oral Monitor Lying Right arm --      Pain Score       8          Wt Readings from Last 1 Encounters:   01/28/22 75 3 kg (166 lb)     Additional Vital Signs:       Date/Time Temp Pulse Resp BP MAP (mmHg) SpO2 O2 Device Patient Position - Orthostatic VS   01/29/22 0400 -- 78 18 130/61 88 100 % None (Room air) Lying   01/29/22 0300 -- 76 18 161/70 100 100 % None (Room air) Lying   01/29/22 0200 -- 80 18 129/60 87 100 % None (Room air) Lying   01/29/22 0100 -- 80 18 168/75 108 100 % None (Room air) Lying   01/29/22 0030 -- 78 18 176/77 Abnormal  111 100 % None (Room air) Lying   01/28/22 2015 -- -- -- 170/85 116 -- -- --   01/28/22 1500 -- -- -- 160/94 121 -- -- --   01/28/22 1445 -- -- 18 173/80 Abnormal  115 -- None (Room air) --   01/28/22 1315 -- -- 18 181/85 Abnormal  122 -- -- --   01/28/22 1215 -- 86 -- 217/93 Abnormal  133 100 % -- --   01/28/22 1120 -- -- -- 200/135 Abnormal  -- -- -- --       Pertinent Labs/Diagnostic Test Results:    1/28 XRAY R ribs   Impression:       Acute mildly displaced fractures involving the right sixth and seventh ribs            Results from last 7 days   Lab Units 01/29/22  0548 01/28/22  1221   WBC Thousand/uL 3 82* 5 13   HEMOGLOBIN g/dL 8 8* 9 0*   HEMATOCRIT % 30 7* 30 6*   PLATELETS Thousands/uL 267 311   NEUTROS ABS Thousands/µL  --  3 34         Results from last 7 days   Lab Units 01/28/22  1221   SODIUM mmol/L 139   POTASSIUM mmol/L 4 7   CHLORIDE mmol/L 107   CO2 mmol/L 21   ANION GAP mmol/L 11   BUN mg/dL 20   CREATININE mg/dL 1 27   EGFR ml/min/1 73sq m 41   CALCIUM mg/dL 8 3     Results from last 7 days   Lab Units 01/28/22  1221   AST U/L 21   ALT U/L 23   ALK PHOS U/L 108   TOTAL PROTEIN g/dL 7 3   ALBUMIN g/dL 3 2*   TOTAL BILIRUBIN mg/dL 0 18*         Results from last 7 days   Lab Units 01/28/22  1221   GLUCOSE RANDOM mg/dL 105             No results found for: BETA-HYDROXYBUTYRATE Results from last 7 days   Lab Units 01/28/22  1221   CK TOTAL U/L 106     Results from last 7 days   Lab Units 01/28/22  1221   HS TNI 0HR ng/L 6         Results from last 7 days   Lab Units 01/29/22  0548   PROTIME seconds 13 9   INR  1 07   PTT seconds 35                                                                                                   ED Treatment:   Medication Administration from 01/28/2022 1026 to 01/29/2022 0858       Date/Time Order Dose Route Action Action by Comments     01/28/2022 1235 acetaminophen (TYLENOL) tablet 975 mg 975 mg Oral Given Jonah Alanis RN      01/28/2022 1235 lidocaine (LIDODERM) 5 % patch 1 patch 1 patch Topical Medication Applied Jonah Alanis RN r ribs     01/28/2022 1433 fentanyl citrate (PF) 100 MCG/2ML 25 mcg 25 mcg Intravenous Given Jonah Alanis RN      01/28/2022 1705 atorvastatin (LIPITOR) tablet 20 mg 20 mg Oral Given April M [facility] Glory, RN      01/28/2022 1704 ferrous sulfate tablet 325 mg 325 mg Oral [facility]      01/28/2022 1702 PHENobarbital tablet 32 4 mg 32 4 mg Oral Given April M Arriaga, RN      01/29/2022 0548 phenytoin (DILANTIN) ER capsule 100 mg 100 mg Oral Given Ron Bangura RN      01/28/2022 2114 phenytoin (DILANTIN) ER capsule 100 mg 100 mg Oral Given Jose A Lyons RN      01/28/2022 1702 phenytoin (DILANTIN) ER capsule 100 mg 100 mg Oral [facility]      01/29/2022 0549 heparin (porcine) subcutaneous injection 5,000 Units 5,000 Units Subcutaneous Given Ron Bangura RN      01/28/2022 2114 heparin (porcine) subcutaneous injection 5,000 Units 5,000 Units Subcutaneous Given Jose A Lyons RN      01/29/2022 0548 acetaminophen (TYLENOL) tablet 975 mg 975 mg Oral Given Ron Bangura RN      01/28/2022 2114 acetaminophen (TYLENOL) tablet 975 mg 975 mg Oral Given Jose A Lyons RN      01/28/2022 1706 lidocaine (LIDODERM) 5 % patch 1 patch 1 patch Topical Not Given April M [facility] Glory, ALENA patch already applied all 1235 today     01/28/2022 2009 HYDROmorphone (DILAUDID) injection 0 2 mg 0 2 mg Intravenous Given Leanne Alas RN      01/29/2022 0018 oxyCODONE (ROXICODONE) IR tablet 5 mg 5 mg Oral Given Darion Nichols RN      01/28/2022 1833 oxyCODONE (ROXICODONE) IR tablet 5 mg 5 mg Oral Given Leanne Alas RN      01/28/2022 1704 senna-docusate sodium (SENOKOT S) 8 6-50 mg per tablet 1 tablet 1 tablet Oral Given April M Kenia Germain RN      01/28/2022 1825 polyethylene glycol (MIRALAX) packet 17 g 17 g Oral Given Leanne Alas RN      01/29/2022 0548 methocarbamol (ROBAXIN) tablet 250 mg 250 mg Oral Given Darion Nichols RN      01/29/2022 0014 methocarbamol (ROBAXIN) tablet 250 mg 250 mg Oral Given Lona Moeller RN      01/28/2022 1702 methocarbamol (ROBAXIN) tablet 250 mg 250 mg Oral Given April Kelton Ac RN         Past Medical History:   Diagnosis Date    Anemia     Carotid stenosis     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 ([facility] [address] )     Coronary artery disease     Essential hypertension     Ischemic cardiomyopathy     Mixed hyperlipidemia     Myelodysplastic syndrome (HCC)     [facility]     Venous insufficiency      Present on Admission:   Fall   Closed fracture of multiple ribs   Acute pain due to trauma   CAD (coronary artery disease)   Myelodysplastic syndrome ([facility] [address] )      Admitting Diagnosis: No admission diagnoses are documented for this encounter    Age/Sex: 78 y o  female  Admission Orders:  Scheduled Medications:  acetaminophen, 975 mg, Oral, Q8H SHRUTHI  aspirin, 81 mg, Oral, Daily  atorvastatin, 20 mg, Oral, Daily With Dinner  calcium carbonate-vitamin D, 1 tablet, Oral, Daily With Breakfast  ferrous sulfate, 325 mg, Oral, BID With Meals  gabapentin, 100 mg, Oral, TID  heparin (porcine), 5,000 Units, Subcutaneous, Q8H SHRUTHI  lidocaine, 1 patch, Topical, Daily  methocarbamol, 500 mg, Oral, Q6H SHRUTHI  PHENobarbital, 32 4 mg, Oral, BID  phenytoin, 100 mg, Oral, Q8H [facility]  senna-docusate sodium, 1 tablet, Oral, BID      Continuous IV Infusions:     PRN Meds:  albuterol, 2 puff, Inhalation, Q6H PRN  HYDROmorphone, 0 2 mg, Intravenous, Q4H PRN  naloxone, 0 04 mg, Intravenous, Q1MIN PRN  ondansetron, 4 mg, Intravenous, Q4H PRN  oxyCODONE, 2 5 mg, Oral, Q4H PRN  oxyCODONE, 5 mg, Oral, Q4H PRN  polyethylene glycol, 17 g, Oral, Daily PRN       Seizure precautions    Enc deep breathing and coughing    Incentive spirometry q 1 h WA    CXR PA and lat  24 hrs after admission    SCD's     IP CONSULT TO ACUTE PAIN SERVICE  IP CONSULT TO CASE MANAGEMENT  IP CONSULT TO GERONTOLOGY    Network Utilization Review Department  ATTENTION: Please call with any questions or concerns to [phone] and carefully listen to the prompts so that you are directed to the right person  All voicemails are confidential   Monika Pfeiffer all requests for admission clinical reviews, approved or denied determinations and any other requests to dedicated fax number below belonging to the campus where the patient is receiving treatment   List of dedicated fax numbers for the Facilities:  [address] DENIALS (Administrative/Medical Necessity) [phone]   [address] (Maternity/NICU/Pediatrics) [phone]   [address]  [address] [facility] [place] [address] [address] [place] [address] [address] [facility] [facility] [phone]

## 2022-01-29 NOTE — PHYSICAL THERAPY NOTE
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)     Coronary artery disease     Essential hypertension     Ischemic cardiomyopathy     Mixed hyperlipidemia     Myelodysplastic syndrome (HCC)     Seizures ([address] )     Venous insufficiency      Length Of Stay: 0  PHYSICAL THERAPY EVALUATION :   01/29/22 1118   PT Last Visit   PT Visit Date 01/29/22   Pain Assessment   Pain Assessment Tool 0-10   Pain Score 8   Pain Location/Orientation Orientation: Right;Location: Rib Cage   Restrictions/Precautions   Other Precautions Fall Risk;Pain   Home Living   Type of [address] [place] [address] One level; Other (Comment)  (no KIMBERLY)   Additional Comments lives alone  family is supportive  pt usually ambulates w/o device but has used rollator since having COVID in November 2021  independent w/ ADLs  family assists w/ IADLs  7 falls in last 6 months  pt sleeps in normal bed  owns lift chair  Prior Function   Comments 2+ edema LEs   General   Additional Pertinent History 1/28/22 at 12:15 blood pressure was 217/93 and 1/29/22 at 00:30 was 176/77  Family/Caregiver Present No   Cognition   Arousal/Participation Alert   Orientation Level Oriented to person; Other (Comment)  (pt was identified w/ full name, birth date)   Following Commands Follows one step commands without difficulty   Comments room air active pulse ox 94% and 63 BPM  supine blood pressure 124/76  Subjective   Subjective pt seen supine in bed  agreed to participate in PT eval  reports having right sided ribs pain and becoming easily short of breath w/ activity     RUE Assessment   RUE Assessment X  (4-/5, shoulder 3-/5)   LUE Assessment   LUE Assessment X  (4-/5, shoulder 3-/5)   RLE Assessment   RLE Assessment WFL  (3+ to 4-/5)   LLE Assessment   LLE Assessment WFL  (3+ to 4-/5)   Light Touch   RLE Light Touch Grossly intact   LLE Light Touch Grossly intact   Bed Mobility   Supine to Sit 5  Supervision   Additional items HOB elevated; Increased time required;Verbal cues;LE management  (for trunk/LE positioning)   Sit to Supine 5  Supervision   Additional items HOB elevated; Increased time required;Verbal cues;LE management  (for trunk/LE positioning, breathing technique)   Transfers   Sit to Stand 5  Supervision   Additional items Increased time required   Stand to Sit 5  Supervision   Additional items Increased time required   Additional Comments left sided brake on pt's rollator noted to be nonfunctioning   Ambulation/Elevation   Gait pattern Foward flexed; Short stride; Excessively slow   Gait Assistance 4  Minimal assist   Additional items Assist x 1;Verbal cues  (for rollator positioning)   Assistive Device 4-wheeled walker   Distance 60 feet  (additional not possible due to pain and fatigue)   Balance   Static Sitting Good   Dynamic Sitting Fair   Static Standing Fair -  (w/ rollator)   Ambulatory Poor +  (w/ rollator)   Activity Tolerance   Activity Tolerance Patient limited by fatigue;Patient limited by pain   Nurse Made Aware spoke to [address] [place] [address] AP   Assessment   Prognosis Good   Problem List Decreased strength;Decreased range of motion;Decreased endurance; Impaired balance;Decreased mobility; Decreased safety awareness;Pain  (LE edema)   Assessment Pt presents with right-sided chest wall pain following 2 recent falls  Dx: right 6th and 7th rib fxs  order placed for PT eval and tx, w/ activity order of up w/ A and up in chair   pt presents w/ comorbidities of COVID-19, anemia, carotid stenosis, CAD, ICMP, HTN, hyperlipidemia, and seizures and personal factors of advanced age, mobilizing w/ assistive device, limited home support, positive fall history and inability to perform IADLs  pt presents w/ pain, weakness, decreased ROM, decreased endurance, impaired balance, gait deviations, decreased safety awareness, fall risk and LE edema  these impairments are evident in findings from physical examination (weakness, decreased ROM and edema of extremities), mobility assessment (need for standby to min assist w/ all phases of mobility when usually mobilizing independently, tolerance to only 60 feet of ambulation and need for cueing for mobility technique), and Barthel Index: 60/100  pt needed input for mobility and breathing technique  pt is at risk for falls due to physical and safety awareness deficits  pt's clinical presentation is unstable/unpredictable (evident in poor blood pressure control, need for assist w/ all phases of mobility when usually mobilizing independently, tolerance to only 60 feet of ambulation, pain impacting overall mobility status and need for input for mobility technique/safety)  pt needs inpatient PT tx to improve mobility deficits and progress mobility training as appropriate  discharge recommendation is for home w/ family support and home PT to reduce fall risk and maximize level of functional independence  Goals   Patient Goals be involved with the [employer]   STG Expiration Date 02/08/22   Short Term Goal #1 pt will:  Increase bilateral LE strength 1/2 grade to facilitate independent mobility, Perform all bed mobility tasks independently to decrease fall risk factors, Perform all transfers independently to improve independence, Ambulate 300 ft  with least restrictive assistive device independently w/o LOB to facilitate eventual return to involvement in [employer] activities, Increase ambulatory balance 1 grade to decrease risk for falls, Complete exercise program independently to improve strength and endurance, Tolerate 3 hr OOB to faciliate upright tolerance and Improve Barthel Index score to 80 or greater to facilitate independence   PT Treatment Day 1   Plan   Treatment/Interventions Functional transfer training;LE strengthening/ROM; Therapeutic exercise; Endurance training;Patient/family training;Equipment eval/education; Bed mobility;Gait training   PT Frequency 3-5x/wk   Recommendation   PT Discharge Recommendation Home with home health rehabilitation   Equipment Recommended [id number]   Change/add to [employer]? No   AM-PAC Basic Mobility Inpatient   Turning in Bed Without Bedrails 4   Lying on Back to Sitting on Edge of Flat Bed 3   Moving Bed to Chair 3   Standing Up From Chair 3   Walk in Room 3   Climb 3-5 Stairs 1   Basic Mobility Inpatient Raw Score 17   Basic Mobility Standardized Score 39 67   Highest Level Of Mobility   -HL Goal 5: Stand one or more mins   -HLM Highest Level of Mobility 7: Walk 25 feet or more   -HLM Goal Achieved Yes   Barthel Index   Feeding 10   Bathing 0   Grooming Score 5   Dressing Score 10   Bladder Score 10   Bowels Score 10   Toilet Use Score 5   Transfers (Bed/Chair) Score 10   Mobility (Level Surface) Score 0   Stairs Score 0   Barthel Index Score 60   Additional Treatment Session   Start Time 1118   End Time 1128   Treatment Assessment Therapist provided education to pt for mobility technique including transfers and rollator management  Education was provided due to findings from evaluation  Occasional repetition was needed for limited carryover to be noted  sit <---> stand transfers modified independent  ambulated 90 feet w/ rollator modified independent  Pt was found to have improvement after education w/ increased ambulation tolerance  Pt continues to be a fall risk   therapist also reviewed recommendations including use of bolster held against right ribs when mobilizing into/out of bed and w/ coughs and sneezes, sleeping in recliner to minimize need for supine <---> sitting transition, activity pacing, and appropriate body mechanics  continued inpatient PT tx is indicated to reduce fall risk factors  Equipment Use rollator   Additional Treatment Day 1   End of Consult   Patient Position at End of Consult Supine; All needs within reach     The patient's AM-PAC Basic Mobility Inpatient Short Form Raw Score is 17  A Raw score of greater than 16 suggests the patient may benefit from discharge to home  Please also refer to the recommendation of the Physical Therapist for safe discharge planning  Skilled PT recommended while in hospital and upon DC to progress pt toward treatment goals       Domi Cedeno, PT

## 2022-02-01 LAB
DME PARACHUTE DELIVERY DATE ACTUAL: NORMAL
DME PARACHUTE DELIVERY DATE REQUESTED: NORMAL
DME PARACHUTE ITEM DESCRIPTION: NORMAL
DME PARACHUTE ORDER STATUS: NORMAL
DME PARACHUTE SUPPLIER NAME: NORMAL
DME PARACHUTE SUPPLIER PHONE: NORMAL

## 2022-02-10 ENCOUNTER — OFFICE VISIT (OUTPATIENT)
Dept: SURGERY | Facility: CLINIC | Age: 77
End: 2022-02-10
Payer: COMMERCIAL

## 2022-02-10 VITALS — WEIGHT: 171 LBS | HEIGHT: 66 IN | TEMPERATURE: 96.9 F | BODY MASS INDEX: 27.48 KG/M2

## 2022-02-10 DIAGNOSIS — S22.41XA CLOSED FRACTURE OF MULTIPLE RIBS OF RIGHT SIDE, INITIAL ENCOUNTER: ICD-10-CM

## 2022-02-10 PROCEDURE — 99213 OFFICE O/P EST LOW 20 MIN: CPT | Performed by: SURGERY

## 2022-02-10 RX ORDER — METHOCARBAMOL 500 MG/1
500 TABLET, FILM COATED ORAL EVERY 6 HOURS PRN
Qty: 30 TABLET | Refills: 0 | Status: SHIPPED | OUTPATIENT
Start: 2022-02-10

## 2022-02-10 NOTE — PROGRESS NOTES
Office Visit - trauma  Ellyn Galicia MRN: [id number]  Encounter: [id number]    Assessment and Plan  Problem List Items Addressed This Visit        Musculoskeletal and Integument    Closed fracture of multiple ribs     - no further workup at this time  - will refill prescription for Robaxin  - no further imaging  - told to call back with questions or concerns  - saturation 100% on room air  - continue outpatient PT and OT         Relevant Medications    methocarbamol (ROBAXIN) 500 mg tablet        Disposition:  Discharge from Trauma Service  No further workup  No further narcotic scripts  Chief Complaint:  Ellyn Galicia is a 78 y o  female who presents for Fall (f/u fall  Rib fx   still having significant pain w/rib fx )    Subjective  Patient offering minimal complaints on presentation  Denying any new pain  Reports that she is feeling much better  Continues to use Robaxin  Still has remainder amount of oxycodone  Otherwise denying any worsening chest pain or shortness of breath  Denying any nausea vomiting  No cough or congestion  Up-to-date on COVID vaccine      Past Medical History:   Diagnosis Date    Anemia     Carotid stenosis     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)     Coronary artery disease     Essential hypertension     Ischemic cardiomyopathy     Mixed hyperlipidemia     Myelodysplastic syndrome (HCC)     Seizures (HCC)     Venous insufficiency        Past Surgical History:   Procedure Laterality Date    CORONARY ARTERY BYPASS GRAFT      x5       Family History   Problem Relation Age of Onset    No Known Problems Mother     No Known Problems Father        Social History     Tobacco Use    Smoking status: Former Smoker     Types: Cigarettes    Smokeless tobacco: Never Used   Vaping Use    Vaping Use: Never used   Substance Use Topics    Alcohol use: Not Currently    Drug use: Never        Medications  Current Outpatient Medications on File Prior to Visit   Medication Sig Dispense Refill    acetaminophen (TYLENOL) 325 mg tablet Take 2 tablets (650 mg total) by mouth every 4 (four) hours as needed for mild pain  0    albuterol (PROVENTIL HFA,VENTOLIN HFA) 90 mcg/act inhaler Inhale 2 puffs every 6 (six) hours as needed      Aspirin 81 MG CAPS Take 81 mg by mouth daily        atorvastatin (LIPITOR) 20 mg tablet Take 20 mg by mouth      Calcium Carbonate-Vitamin D 600-200 MG-UNIT CAPS Take 1 tablet by mouth 2 (two) times a day      darbepoetin rhonda (ARANESP) 100 mcg/mL Inject 75 mcg under the skin      ferrous sulfate 325 (65 Fe) mg tablet Take 325 mg by mouth 2 (two) times a day with meals        furosemide (LASIX) 20 mg tablet Take 20 mg by mouth as needed      Lidocaine 4 % PTCH Apply 1 patch topically daily  0    oxyCODONE (ROXICODONE) 5 immediate release tablet Take 0 5-1 tablets (2 5-5 mg total) by mouth every 4 (four) hours as needed for moderate pain or severe pain Max Daily Amount: 30 mg 20 tablet 0    PHENobarbital 30 mg tablet Take 30 mg by mouth 2 (two) times a day      phenytoin (DILANTIN) 100 mg ER capsule Take 100 mg by mouth Three times a day      polyethylene glycol (MiraLax) 17 g packet Take 1 application by mouth daily as needed        [DISCONTINUED] methocarbamol (ROBAXIN) 500 mg tablet Take 1 tablet (500 mg total) by mouth every 6 (six) hours 40 tablet 0    senna-docusate sodium (SENOKOT S) 8 6-50 mg per tablet Take 1 tablet by mouth 2 (two) times a day for 5 days 10 tablet 0     No current facility-administered medications on file prior to visit  Allergies  Allergies   Allergen Reactions    Carvedilol Other (See Comments)     Orthostatic Hypotension    Iodinated Diagnostic Agents Angioedema    Shellfish Allergy - Food Allergy Angioedema    Lisinopril Cough       Review of Systems   Constitutional: Negative for activity change, appetite change and fever     HENT: Negative for ear discharge, ear pain, rhinorrhea, sore throat and trouble swallowing  Eyes: Negative for photophobia, pain and redness  Respiratory: Negative for apnea, cough, chest tightness, shortness of breath and stridor  Cardiovascular: Negative for chest pain and palpitations  Gastrointestinal: Negative for abdominal distention, abdominal pain, nausea and vomiting  Endocrine: Negative for cold intolerance and heat intolerance  Genitourinary: Negative  Musculoskeletal: Negative for arthralgias, back pain, neck pain and neck stiffness  Skin: Negative  Neurological: Negative for dizziness, weakness, light-headedness and numbness  Hematological: Negative  Objective  Vitals:    02/10/22 1238   Temp: (!) 96 9 °F (36 1 °C)       Physical Exam  Vitals reviewed  Constitutional:       Appearance: Normal appearance  HENT:      Head: Normocephalic and atraumatic  Cardiovascular:      Rate and Rhythm: Normal rate and regular rhythm  Pulses: Normal pulses  Heart sounds: Normal heart sounds  Pulmonary:      Effort: Pulmonary effort is normal  No respiratory distress  Breath sounds: Normal breath sounds  Chest:      Chest wall: No tenderness  Skin:     General: Skin is warm and dry  Neurological:      Mental Status: She is alert and oriented to person, place, and time  Mental status is at baseline

## 2022-02-10 NOTE — ASSESSMENT & PLAN NOTE
- no further workup at this time  - will refill prescription for Robaxin  - no further imaging  - told to call back with questions or concerns  - saturation 100% on room air  - continue outpatient PT and OT

## 2024-12-04 ENCOUNTER — TELEPHONE (OUTPATIENT)
Dept: NEPHROLOGY | Facility: CLINIC | Age: 79
End: 2024-12-04

## 2024-12-04 DIAGNOSIS — N18.2 STAGE 2 CHRONIC KIDNEY DISEASE: Primary | ICD-10-CM

## 2024-12-04 NOTE — TELEPHONE ENCOUNTER
Phone call from patient returning call - normally gets labs done at [facility]. Please fax orders to [facility] ([place]). Patient to go for labs before Friday.

## 2024-12-04 NOTE — TELEPHONE ENCOUNTER
Brooke calling for [facility] in regards to patient and her labs. She stated Destiny was talking to them and upset because she got a call stating she needs her labs done prior to her appt. She gets her labs done at the cancer center and her next treatment is 12/11 and her labs will be drawn then. She is a very hard stick. They want to know if this is okay and if patient can still be seen for her appt on 12/9? Please advise and call patient.

## 2024-12-04 NOTE — TELEPHONE ENCOUNTER
I called and left a VM for Destiny regarding completing blood and urine studies prior to upcoming consult appt on 12/09/2024.

## 2024-12-09 ENCOUNTER — OFFICE VISIT (OUTPATIENT)
Age: 79
End: 2024-12-09
Payer: COMMERCIAL

## 2024-12-09 VITALS
HEART RATE: 83 BPM | DIASTOLIC BLOOD PRESSURE: 70 MMHG | SYSTOLIC BLOOD PRESSURE: 150 MMHG | WEIGHT: 176 LBS | OXYGEN SATURATION: 99 % | BODY MASS INDEX: 28.28 KG/M2 | HEIGHT: 66 IN | TEMPERATURE: 97.6 F

## 2024-12-09 DIAGNOSIS — D46.9 MYELODYSPLASTIC SYNDROME (HCC): ICD-10-CM

## 2024-12-09 DIAGNOSIS — I25.10 CORONARY ARTERY DISEASE INVOLVING NATIVE CORONARY ARTERY OF NATIVE HEART, UNSPECIFIED WHETHER ANGINA PRESENT: ICD-10-CM

## 2024-12-09 DIAGNOSIS — I11.0 HYPERTENSIVE HEART DISEASE WITH HEART FAILURE (HCC): ICD-10-CM

## 2024-12-09 DIAGNOSIS — N18.32 STAGE 3B CHRONIC KIDNEY DISEASE (HCC): Primary | ICD-10-CM

## 2024-12-09 DIAGNOSIS — I50.42 CHRONIC COMBINED SYSTOLIC AND DIASTOLIC CHF (CONGESTIVE HEART FAILURE) (HCC): ICD-10-CM

## 2024-12-09 PROCEDURE — 99203 OFFICE O/P NEW LOW 30 MIN: CPT | Performed by: INTERNAL MEDICINE

## 2024-12-09 NOTE — PROGRESS NOTES
[facility]  Consultation - Nephrology    Destiny Landis 79 y.o. female MRN: [id number]      A/P:    1. Stage 3b chronic kidney disease (HCC)  -     Urinalysis with microscopic; Future  -     Albumin / creatinine urine ratio; Future  -     PTH, intact; Future  -     Comprehensive metabolic panel; Future  -     Protein / creatinine ratio, urine; Future  -     Uric acid; Future  -     US kidney and bladder; Future; Expected date: 12/09/2024  -     Urinalysis with microscopic; Future; Expected date: 03/19/2025  -     PTH, intact; Future; Expected date: 03/19/2025  -     Albumin / creatinine urine ratio; Future; Expected date: 03/19/2025  -     Comprehensive metabolic panel; Future; Expected date: 03/19/2025  -     Uric acid; Future; Expected date: 03/19/2025  -     Protein electrophoresis, serum; Future; Expected date: 03/19/2025  -     Protein electrophoresis, urine; Future; Expected date: 03/19/2025  -     [place]/Lambda Light Chains Free With Ratio, Serum; Future; Expected date: 03/19/2025  2. Hypertensive heart disease with heart failure (HCC)  -     US kidney and bladder; Future; Expected date: 12/09/2024  -     Urinalysis with microscopic; Future; Expected date: 03/19/2025  -     PTH, intact; Future; Expected date: 03/19/2025  -     Albumin / creatinine urine ratio; Future; Expected date: 03/19/2025  -     Comprehensive metabolic panel; Future; Expected date: 03/19/2025  -     Uric acid; Future; Expected date: 03/19/2025  -     Protein electrophoresis, serum; Future; Expected date: 03/19/2025  -     Protein electrophoresis, urine; Future; Expected date: 03/19/2025  -     [place]/Lambda Light Chains Free With Ratio, Serum; Future; Expected date: 03/19/2025  3. Chronic combined systolic and diastolic CHF (congestive heart failure) (HCC)  -     Urinalysis with microscopic; Future; Expected date: 03/19/2025  -     PTH, intact; Future; Expected date: 03/19/2025  -     Albumin /  creatinine urine ratio; Future; Expected date: 03/19/2025  -     Comprehensive metabolic panel; Future; Expected date: 03/19/2025  -     Uric acid; Future; Expected date: 03/19/2025  -     Protein electrophoresis, serum; Future; Expected date: 03/19/2025  -     Protein electrophoresis, urine; Future; Expected date: 03/19/2025  -     [place]/Lambda Light Chains Free With Ratio, Serum; Future; Expected date: 03/19/2025  4. Myelodysplastic syndrome (HCC)  -     Urinalysis with microscopic; Future; Expected date: 03/19/2025  -     PTH, intact; Future; Expected date: 03/19/2025  -     Albumin / creatinine urine ratio; Future; Expected date: 03/19/2025  -     Comprehensive metabolic panel; Future; Expected date: 03/19/2025  -     Uric acid; Future; Expected date: 03/19/2025  -     Protein electrophoresis, serum; Future; Expected date: 03/19/2025  -     Protein electrophoresis, urine; Future; Expected date: 03/19/2025  -     [place]/Lambda Light Chains Free With Ratio, Serum; Future; Expected date: 03/19/2025  5. Coronary artery disease involving native coronary artery of native heart, unspecified whether angina present  -     Urinalysis with microscopic; Future; Expected date: 03/19/2025  -     PTH, intact; Future; Expected date: 03/19/2025  -     Albumin / creatinine urine ratio; Future; Expected date: 03/19/2025  -     Comprehensive metabolic panel; Future; Expected date: 03/19/2025  -     Uric acid; Future; Expected date: 03/19/2025  -     Protein electrophoresis, serum; Future; Expected date: 03/19/2025  -     Protein electrophoresis, urine; Future; Expected date: 03/19/2025  -     [place]/Lambda Light Chains Free With Ratio, Serum; Future; Expected date: 03/19/2025       I have reviewed pertinent labs and imaging with patient.    1.CKD3b  -Baseline Cr:Previously 1.0-1.3mg/dl dating back to 2019. Cr 1.19 12/13/23.   Cr 1.37 2/14/24.Cr 1.52 10/16/24. Hemoglobin declined to 7.7 10/16/24.This was a progressive drop from 11.0  8/21/24->8.5 9/18/24.  -Etiology: Not biopsy proven, suspect vascular disease, HTN nephrosclerosis, prerenal effect of anemia, tobacco related disease (25 pack history), chronic heart failure.  No previous protein quantification, check.  Denies hx DM,A1C 5.5 12/13/23.  -BP currently hypertensive but she has orthostatic hypotension  -volume status shows evidence of hypervolemia, but documented chronic LE edema. Does not take lasix as prescribed, which is daily.    Reviewed CKD stages, Cr and eGFR trends.  Volume status appears hypervolemic with LE edema.  Advise 2 gram Na diet.   Would not add RAASI given risk of ANIKA /hypotension. Would consider addition of SGLT2I given CHF /CKD history.   Check renal US for baseline.  Advise to repeat chemistry now and assess renal indices. Last chemistry 10/16/24 Cr 1.52 with eGFR 35 ml/min.  Further recommendation based on labs.  Fu in 3 months with labs prior.     2. Hypertensive heart disease with heart failure  Reports hx orthostatic hypotension which is confirmed with cardiology records. Given symptomatic orthostasis, holding on medication adjustment at this time.  Check renal US for baseline, may consider vascular duplex.    3. Chronic combined CHF  Patient has chronic BL LE edema with suspected chronic lymphedema.She has compression devices at home.   She does not closely watch sodium intake. Reports infrequent furosemide use, once a month.    She has a hx orthostatic hypotension, so cardiology reluctant to start therapy.   Given long standing relationship with cardiology, would avoid medication addition.  She may benefit from SGLT2I, will discuss with cardiology.    4.Myelodysplastic syndrome, currently under management of Hematology.   Continue iron infusions and Aranesp.     5. CAD   Continue lipid control with Atorvastatin 20mg/day and ASA 81mg/day.  Denies chest pain/pressure.    6. Mixed HLD  Continue stating management per primary and cardiology. Continue Atorvastatin  20mg/day.    For the prevention of kidney injury: If you are sick and not eating well or drinking well OR vomiting or having diarrhea, temporarily hold your ACE-inhibitor / ARB / diuretic while sick and call office for further instructions. Otherwise, please take medications as prescribed.       The patient and any family members present were counseled today on risks benefits and alternatives of any medications, and were agreeable to the treatment plan.  They were counseled on diagnostic testing if necessary, and projected outcomes as are available and medically indicated.  All questions were asked and answered during the encounter. If more questions are to arise the patient will call the office. Alarm and return precautions discussed and accepted.       Thank you for allowing us to participate in the care of your patient.        History of Present Illness   Physician Requesting Consult: No att. providers found    HPI: Destiny Landis is a 79 y.o. year old female who presents for CKD evaluation in the [place] office, referred by PCP.   Patient has hx chronic combined systolic and diastolic CHF, CAD sp CABG X2 ,Bioprosthetic AVR Jan 2019, ischemic cardiomyopathy, orthostatic hypotension, mixed HLD, anemia, Carotic stenosis, Myelodysplastic syndrome.Denies previous evaluation by nephrology. Patient accompanied by friend during the visit, consent obtained to discuss medical situation in front of friend.   Cr trends dating back to 2019 in 1.0-1.3mg/dL. Cr 1.19 12/13/23.   Cr 1.37 2/14/24.Cr 1.52 10/16/24. Hemoglobin declined to 7.7 10/16/24.This was a progressive drop from 11.0 8/21/24->8.5 9/18/24.  Referred from Dr Tyra Barrow.   Follows with cardiology Dr Rodriguez HTN management and has hx chronic combined CHF. Last seen 9/27/24.  Reports bone marrow biopsy last Monday, awaiting results.   She follows with hematology and has MDS and anemia, and goes to infusion center at [facility]. She undergoes iron infusion and  Aranesp q 3 weeks.    She had HTN 5 years.   Denies DM.  Denies hematuria/nephrolithiasis/pyelo/UTI.Denies foamy urine.   Denies urology evaluation in the past.   Denies family hx of kidney disease.   Denies NSAID use.  Denies Hepatitis/ TB/HIV.   Previous tobacco use, 1 ppd x 25 years.     She had IV Pyelogram very long time ago.Denies recent imaging with contrast.    Denies hx kidney biopsy.  Denies seeing rheumatology,      Takes lasix once a month if that.     She has compression devices to manage chronic edema.    Constitutional ROS- Denies fatigue, fever, chills, night sweats, weight changes.  HEENT ROS- Denies headaches or history of trauma, blurred vision..  Endocrine ROS- No history diabetes mellitus or thyroid disease.  Cardiovascular ROS- RASCON 20-30 feet.   Pulmonary ROS-  Denies cough, hemoptysis, shortness of breath.  GI ROS- +nausea related to medication, + constipation. Dark stool.   Hematological ROS- hx blood transfusion   Genitourinary ROS- denies pyelo/UTI   Lymphatic ROS- Denies lymphadenopathy.  Musculoskeletal ROS- Denies history of muscle weakness, joint pain.  Dermatological ROS- Denies rash, wounds, ulcers, itching, jaundice.  Psychiatric ROS- Denies hallucinations, disorientation.  Neurological ROS- No stroke or TIA symptoms.     Historical Information   Past Medical History:   Diagnosis Date    Anemia     Carotid stenosis     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)     Coronary artery disease     Essential hypertension     Ischemic cardiomyopathy     Mixed hyperlipidemia     Myelodysplastic syndrome (HCC)     Seizures (HCC)     Venous insufficiency      Past Surgical History:   Procedure Laterality Date    CORONARY ARTERY BYPASS GRAFT      x5     Social History   Social History     Substance and Sexual Activity   Alcohol Use Not Currently     Social History     Substance and Sexual Activity   Drug Use Never     Social History     Tobacco Use   Smoking Status Former     Current packs/day: 1.00    Average packs/day: 1 pack/day for 25.0 years (25.0 ttl pk-yrs)    Types: Cigarettes   Smokeless Tobacco Never     Family History   Problem Relation Age of Onset    No Known Problems Mother     No Known Problems Father        Meds/Allergies   all current active meds have been reviewed, current meds:     Current Outpatient Medications:     acetaminophen (TYLENOL) 325 mg tablet, Take 2 tablets (650 mg total) by mouth every 4 (four) hours as needed for mild pain, Disp: , Rfl: 0    Aspirin 81 MG CAPS, Take 81 mg by mouth daily  , Disp: , Rfl:     atorvastatin (LIPITOR) 20 mg tablet, Take 20 mg by mouth, Disp: , Rfl:     Calcium Carbonate-Vitamin D 600-200 MG-UNIT CAPS, Take 1 tablet by mouth 2 (two) times a day, Disp: , Rfl:     ferrous sulfate 325 (65 Fe) mg tablet, Take 325 mg by mouth daily Take 1 every other day., Disp: , Rfl:     furosemide (LASIX) 20 mg tablet, Take 20 mg by mouth as needed, Disp: , Rfl:     PHENobarbital 30 mg tablet, Take 30 mg by mouth 2 (two) times a day, Disp: , Rfl:     phenytoin (DILANTIN) 100 mg ER capsule, Take 100 mg by mouth Three times a day, Disp: , Rfl:     polyethylene glycol (MiraLax) 17 g packet, Take 1 application by mouth daily as needed  , Disp: , Rfl:     darbepoetin rhonda (ARANESP) 100 mcg/mL, Inject 75 mcg under the skin (Patient not taking: Reported on 12/9/2024), Disp: , Rfl:     Lidocaine 4 % PTCH, Apply 1 patch topically daily (Patient not taking: Reported on 12/9/2024), Disp: , Rfl: 0    methocarbamol (ROBAXIN) 500 mg tablet, Take 1 tablet (500 mg total) by mouth every 6 (six) hours as needed for muscle spasms (Patient not taking: Reported on 12/9/2024), Disp: 30 tablet, Rfl: 0    oxyCODONE (ROXICODONE) 5 immediate release tablet, Take 0.5-1 tablets (2.5-5 mg total) by mouth every 4 (four) hours as needed for moderate pain or severe pain Max Daily Amount: 30 mg (Patient not taking: Reported on 12/9/2024), Disp: 20 tablet, Rfl: 0     senna-docusate sodium (SENOKOT S) 8.6-50 mg per tablet, Take 1 tablet by mouth 2 (two) times a day for 5 days (Patient not taking: Reported on 12/9/2024), Disp: 10 tablet, Rfl: 0     Allergies   Allergen Reactions    Carvedilol Other (See Comments)     Orthostatic Hypotension    Iodinated Contrast Media Angioedema    Shellfish Allergy - Food Allergy Angioedema    Lisinopril Cough       Objective     Vitals:    12/09/24 1418   BP: 150/70   Pulse: 83   Temp: 97.6 °F (36.4 °C)   SpO2: 99%       General Appearance:    No acute distress. Cooperative. Appears stated age.   Head:    Normocephalic. Atraumatic. Normal jaw occlusion.   Eyes:    Lids, conjunctiva normal. No scleral icterus.   Ears:    Normal external ears.   Nose:   Nares normal. No drainage.   Mouth:   Lips, tongue normal. Mucosa normal.    Neck:   Supple. Symmetrical.   Back:     Symmetric. No CVA tenderness.   Lungs:     Normal respiratory effort. Clear to auscultation bilaterally.   Chest wall:    No tenderness or deformity.   Heart:    Regular rate and rhythm. Normal S1 and S2. No murmur. No JVD. Chronic severe edema   Abdomen:     Soft. Non-tender. Bowel sounds active.   Genitourinary:      Extremities:   Chronic lymphedema   Skin:   Warm and dry. No pallor, jaundice, rash, ecchymoses.   Neurologic:   Alert and oriented to person, place, time. No focal deficit.         Current Weight: Weight - Scale: 79.8 kg (176 lb)    First Weight:    Wt Readings from Last 3 Encounters:   12/09/24 79.8 kg (176 lb)   02/10/22 77.6 kg (171 lb)   01/28/22 75.3 kg (166 lb)        Lab Results:  I have personally reviewed pertinent labs.    10/16/24  Na 139 K 4.8 Cl 111 TCO2 25 BUN 38 Cr 1.52 eGFR 35 ml/min, glucose 113.      Radiology review:  No renal imaging available at this time.      Marcia Hale,       This consultation note was produced in part using a dictation device which may document imprecise wording from author's original intent.

## 2024-12-17 ENCOUNTER — HOSPITAL ENCOUNTER (OUTPATIENT)
Dept: ULTRASOUND IMAGING | Facility: HOSPITAL | Age: 79
Discharge: HOME | End: 2024-12-17
Attending: INTERNAL MEDICINE
Payer: COMMERCIAL

## 2024-12-17 DIAGNOSIS — N18.32 STAGE 3B CHRONIC KIDNEY DISEASE (HCC): ICD-10-CM

## 2024-12-17 DIAGNOSIS — I11.0 HYPERTENSIVE HEART DISEASE WITH HEART FAILURE (HCC): ICD-10-CM

## 2024-12-17 PROCEDURE — 76775 US EXAM ABDO BACK WALL LIM: CPT

## 2024-12-22 ENCOUNTER — TELEPHONE (OUTPATIENT)
Dept: NEPHROLOGY | Facility: CLINIC | Age: 79
End: 2024-12-22

## 2024-12-24 ENCOUNTER — RESULTS FOLLOW-UP (OUTPATIENT)
Dept: NEPHROLOGY | Facility: CLINIC | Age: 79
End: 2024-12-24

## 2024-12-30 NOTE — TELEPHONE ENCOUNTER
I called the office of Dr Rodriguez (cardiology) and request provider reaches via phone to Dr Hale to discuss mutual patient care. I left Dr Hale's cell number as she requested.

## 2025-01-03 NOTE — TELEPHONE ENCOUNTER
I called and left a VM for Destiny to call the office back to discuss the following:    ----- Message from Marcia Hale DO sent at 12/24/2024 11:42 AM EST -----  Please call patient, kidney imaging shows evidence of kidney disease, which is consistent with her blood work. There is a benign nonconerning kidney cyst on right kidney that needs no further imaging.

## 2025-05-02 ENCOUNTER — TELEPHONE (OUTPATIENT)
Age: 80
End: 2025-05-02

## 2025-05-09 ENCOUNTER — OFFICE VISIT (OUTPATIENT)
Age: 80
End: 2025-05-09
Payer: COMMERCIAL

## 2025-05-09 VITALS
WEIGHT: 175.6 LBS | BODY MASS INDEX: 28.34 KG/M2 | DIASTOLIC BLOOD PRESSURE: 72 MMHG | SYSTOLIC BLOOD PRESSURE: 170 MMHG | HEART RATE: 93 BPM | TEMPERATURE: 96.5 F | OXYGEN SATURATION: 95 %

## 2025-05-09 DIAGNOSIS — I95.1 ORTHOSTATIC HYPOTENSION: ICD-10-CM

## 2025-05-09 DIAGNOSIS — D63.1 ANEMIA OF RENAL DISEASE: ICD-10-CM

## 2025-05-09 DIAGNOSIS — I50.32 CHRONIC DIASTOLIC CHF (CONGESTIVE HEART FAILURE) (HCC): ICD-10-CM

## 2025-05-09 DIAGNOSIS — N18.32 STAGE 3B CHRONIC KIDNEY DISEASE (HCC): ICD-10-CM

## 2025-05-09 DIAGNOSIS — N18.9 ANEMIA OF RENAL DISEASE: ICD-10-CM

## 2025-05-09 DIAGNOSIS — I10 ESSENTIAL HYPERTENSION: Primary | ICD-10-CM

## 2025-05-09 DIAGNOSIS — E78.2 MIXED HYPERLIPIDEMIA: ICD-10-CM

## 2025-05-09 PROCEDURE — 99214 OFFICE O/P EST MOD 30 MIN: CPT | Performed by: INTERNAL MEDICINE

## 2025-05-09 NOTE — PATIENT INSTRUCTIONS
Your kidney function is stable at 40%.You have stable kidney disease.   Potassium improved, if you take a lasix, okay to take potasium.

## 2025-05-09 NOTE — PROGRESS NOTES
Assessment & Plan:    1. Essential hypertension  2. Orthostatic hypotension  3. Stage 3b chronic kidney disease (HCC)  -     Urinalysis with microscopic; Future; Expected date: 11/04/2025  -     Uric acid; Future; Expected date: 11/04/2025  -     PTH, intact; Future; Expected date: 11/04/2025  -     Albumin / creatinine urine ratio; Future; Expected date: 11/04/2025  -     CBC and differential; Future; Expected date: 11/04/2025  -     Comprehensive metabolic panel; Future; Expected date: 11/04/2025  4. Anemia of renal disease  5. Chronic diastolic CHF (congestive heart failure) (HCC)  6. Mixed hyperlipidemia           Essential HTN  Volume status appears compensated.  Need to accept some degree supine HTN to avoid complication of orthostatic hypotension.  Reasonable supine -150.  /80 in cardiology office.  Denies changes to BP trends , chronically elevated. This is not a new concern per patient but expresses higher BP trends than reported. She is apprehensive for treatment changes which is understandable.   Will reach out to cardiology colleagues.    2. Orthostatic hypotension  See discussion above. Reviewed cardiology documentation 4/18.    3. [id number]  Etiology is not biopsy proven but concern for vascular disease, HTN nephrosclerosis, anemia, tobacco related disease (25 pack year hx) and chronic chf.  BP hypertensive but this is a chronic issue and not treated due to discretion of cardiology colleagues for concern of orthostasis.  Cr trend in 1.3-1.6 mg/dL range. CKD noted since 2019.  UACR 236 mg /g cr 12/11/24,     Oncology colleagues following chemistry frequently, from nephrology perspective, no need to monitor this closely and advise fu every 3-4 months.   Potassium trend now normalized, can take potassium with lasix only, otherwise not needed. Potassium ULN.Metabolic parameters otherwise stable.   Will have office reach out to cardiology colleagues on discussion of BP and SGLT2I. Not a candidate  for RAASI with potassium trends.  Repeat protein quantification.     4. Anemia renal disease/MDS  Continue monitoring per oncology and Aranesp.    5. Chronic diastolic CHF  Evidence of chronic leg swelling which is multifactorial with venous insufficiency.  Weight stable at 175 and 176 in December.   Continue lasix 1-2 times as needed.  Continue fu with cardiology.  We have discussed Jardiance addition but concerned for side effect profile with ANIKA/dehydration/hypotension/infection.     6. Mixed HLD  Continue lipid monitoring and lipitor per primary /cardiology.     Health maintenance  Remainder of medical care per primary care.  Continue age appropriate vaccine and cancer screening per primary team.    The benefits, risks and alternatives to the treatment plan were discussed at this visit. Patient was advised of common adverse effects of any medical therapies prescribed. All questions were answered and discussed with the patient and any accompanying family members or caretakers.      Subjective:      Patient ID: Destiny Landis is a 79 y.o. female presents for CKD follow up in the [place] office. Patient has hx CAD , chronic diastolic CHF, HLD, hx bioprosthetic aortic valve replacement, venous insufficiency, MDS, COVID.     HPI  In the interim since last visit, continues to receive Aranesp per oncology with goal>11.   Follows with cardio 4/18 - bp in 130-140 and orthostatic hypotension remains chronic issue.   Encouraged elastic support.  Lasix 20mg as needed once or twice a week. Takes 20meq KCL with lasix.    BP labile at home, supine HTN.Not on antihypertensive therapy due to orthostatic concerns.     Reports issus with nose bleed which have improved and down 10 lb since last visit.  Denies blood in the stool.    Denies gout.    Uses walker at home.    The following portions of the patient's history were reviewed and updated as appropriate: allergies, current medications, past family history, past medical  history, past social history, past surgical history, and problem list.    Review of Systems   Respiratory: Negative.     Cardiovascular:  Positive for leg swelling.   Gastrointestinal: Negative.    Genitourinary: Negative.    All other systems reviewed and are negative.        Objective:      /72 (BP Location: Right arm, Patient Position: Sitting)   Pulse 93   Temp (!) 96.5 °F (35.8 °C)   Wt 79.7 kg (175 lb 9.6 oz)   SpO2 95%   BMI 28.34 kg/m²          Physical Exam  Vitals reviewed.   Constitutional:       General: She is not in acute distress.     Appearance: She is not ill-appearing.   HENT:      Head: Normocephalic and atraumatic.      Nose: Nose normal. No congestion.      Mouth/Throat:      Mouth: Mucous membranes are moist.      Pharynx: Oropharynx is clear.     Eyes:      Extraocular Movements: Extraocular movements intact.      Conjunctiva/sclera: Conjunctivae normal.       Cardiovascular:      Rate and Rhythm: Normal rate and regular rhythm.      Heart sounds:      No friction rub.   Pulmonary:      Breath sounds: Normal breath sounds. No wheezing or rales.   Abdominal:      Palpations: Abdomen is soft.      Tenderness: There is no abdominal tenderness. There is no guarding.     Musculoskeletal:      Right lower leg: Edema present.      Left lower leg: Edema present.     Skin:     Coloration: Skin is not jaundiced.      Findings: No bruising.     Neurological:      General: No focal deficit present.      Mental Status: She is alert and oriented to person, place, and time. Mental status is at baseline.      Cranial Nerves: No cranial nerve deficit.     Psychiatric:         Mood and Affect: Mood normal.         Behavior: Behavior normal.             Lab Results   Component Value Date    SODIUM 138 04/16/2025    K 5.2 04/16/2025     04/16/2025    CO2 25 04/16/2025    AGAP 4 04/16/2025    BUN 21 04/16/2025    CREATININE 1.34 (H) 04/16/2025    GLUC 78 04/16/2025    CALCIUM 8 (L) 04/16/2025     "AST 48 (H) 04/16/2025    ALT 44 04/16/2025    ALKPHOS 92 04/16/2025    TP 6.1 (L) 04/16/2025    TBILI 0.3 04/16/2025    EGFR 41 (L) 04/16/2025      Lab Results   Component Value Date    CREATININE 1.34 (H) 04/16/2025    CREATININE 1.45 (H) 03/24/2025    CREATININE 1.63 (H) 03/17/2025    CREATININE 1.64 (H) 03/14/2025    CREATININE 1.43 (H) 02/19/2025    CREATININE 1.38 (H) 02/05/2025    CREATININE 1.35 (H) 12/11/2024    CREATININE 1.52 (H) 10/16/2024    CREATININE 1.37 (H) 02/14/2024    CREATININE 1.19 (H) 12/13/2023    CREATININE 1.11 04/13/2022    CREATININE 1.27 01/28/2022    CREATININE 1.21 (H) 09/29/2021    CREATININE 1.11 07/06/2020    CREATININE 1.25 (H) 06/19/2020      No results found for: \"COLORU\", \"CLARITYU\", \"SPECGRAV\", \"PHUR\", \"LEUKOCYTESUR\", \"NITRITE\", \"PROTEIN UA\", \"GLUCOSEU\", \"KETONESU\", \"UROBILINOGEN\", \"BILIRUBINUR\", \"BLOODU\", \"RBCUA\", \"WBCUA\", \"EPIS\", \"BACTERIA\", \"MUCOUSTHREAD\"   No results found for: \"LABPROT\"  No results found for: \"MICROALBUR\", \"BBDS42GBE\"  Lab Results   Component Value Date    WBC 3.82 (L) 01/29/2022    HGB 8.8 (L) 01/29/2022    HCT 30.7 (L) 01/29/2022    MCV 87 01/29/2022     01/29/2022      Lab Results   Component Value Date    HGB 8.8 (L) 01/29/2022    HGB 9.0 (L) 01/28/2022    HGB 8.7 (L) 07/11/2019      Lab Results   Component Value Date    IRON 68 05/07/2025    TIBC 248 (L) 05/07/2025    FERRITIN 71 04/16/2025      No results found for: \"PTHCALCIUM\", \"VKUK37IALHGI\", \"PHOSPHORUS\"   No results found for: \"CHOLESTEROL\", \"HDL\", \"LDLCALC\", \"TRIG\"   Lab Results   Component Value Date    URICACID 7.8 (H) 12/11/2024      Lab Results   Component Value Date    HGBA1C 5.5 12/13/2023      No results found for: \"TSHANTIBODY\", \"A1MPTRK\", \"FREET4\"   No results found for: \"CLARISA\", \"DSDNAAB\", \"RFIGM\"   No results found for: \"PROT\", \"UPEP\", \"IMMUNOFIX\", \"KAPPALAMBDA\", \"KAPPALIGHT\"     Portions of the record may have been created with voice recognition software. Occasional wrong word " "or \"sound a like\" substitutions may have occurred due to the inherent limitations of voice recognition software. Read the chart carefully and recognize, using context, where substitutions have occurred. If you have any questions, please contact the dictating provider.      "

## 2025-05-26 ENCOUNTER — TELEPHONE (OUTPATIENT)
Dept: NEPHROLOGY | Facility: CLINIC | Age: 80
End: 2025-05-26

## 2025-05-26 NOTE — TELEPHONE ENCOUNTER
Please place call to [facility] cardiology and provide my cell to discuss care.

## 2025-05-28 NOTE — TELEPHONE ENCOUNTER
Spoke with Lyndsey at [facility] cardiology and she will pass the message along to call Dr Hlae.